# Patient Record
Sex: FEMALE | Race: BLACK OR AFRICAN AMERICAN | NOT HISPANIC OR LATINO | Employment: FULL TIME | RURAL
[De-identification: names, ages, dates, MRNs, and addresses within clinical notes are randomized per-mention and may not be internally consistent; named-entity substitution may affect disease eponyms.]

---
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
## 2021-01-11 ENCOUNTER — HISTORICAL (OUTPATIENT)
Dept: ADMINISTRATIVE | Facility: HOSPITAL | Age: 21
End: 2021-01-11

## 2021-01-11 LAB — SARS-COV+SARS-COV-2 AG RESP QL IA.RAPID: NEGATIVE

## 2021-01-26 ENCOUNTER — HISTORICAL (OUTPATIENT)
Dept: ADMINISTRATIVE | Facility: HOSPITAL | Age: 21
End: 2021-01-26

## 2021-01-26 LAB
ALBUMIN SERPL BCP-MCNC: 4.7 G/DL (ref 3.5–5)
ALBUMIN/GLOB SERPL: 1.1 {RATIO}
ALP SERPL-CCNC: 65 U/L (ref 52–144)
ALT SERPL W P-5'-P-CCNC: 21 U/L (ref 13–56)
AMPHET UR QL SCN: NEGATIVE NG/ML
ANION GAP SERPL CALCULATED.3IONS-SCNC: 15 MMOL/L
APAP SERPL-MCNC: <2 UG/ML (ref 10–30)
AST SERPL W P-5'-P-CCNC: 14 U/L (ref 15–37)
BARBITURATES UR QL SCN: NEGATIVE NG/ML
BASOPHILS # BLD AUTO: 0.03 X10E3/UL (ref 0–0.2)
BASOPHILS NFR BLD AUTO: 0.4 % (ref 0–1)
BENZODIAZ METAB UR QL SCN: NEGATIVE NG/ML
BILIRUB SERPL-MCNC: 0.9 MG/DL (ref 0–1.2)
BILIRUB UR QL STRIP: NEGATIVE MG/DL
BUN SERPL-MCNC: 10 MG/DL (ref 7–18)
BUN/CREAT SERPL: 10.5
CALCIUM SERPL-MCNC: 9.4 MG/DL (ref 8.5–10.1)
CANNABINOIDS UR QL SCN: NEGATIVE NG/ML
CHLORIDE SERPL-SCNC: 103 MMOL/L (ref 98–107)
CLARITY UR: ABNORMAL
CO2 SERPL-SCNC: 27 MMOL/L (ref 21–32)
COCAINE UR QL SCN: NEGATIVE NG/ML
COLOR UR: ABNORMAL
CREAT SERPL-MCNC: 0.95 MG/DL (ref 0.55–1.02)
EOSINOPHIL # BLD AUTO: 0.02 X10E3/UL (ref 0–0.5)
EOSINOPHIL NFR BLD AUTO: 0.3 % (ref 1–4)
ERYTHROCYTE [DISTWIDTH] IN BLOOD BY AUTOMATED COUNT: 12.1 % (ref 11.5–14.5)
ETHANOL SERPL-MCNC: NORMAL MG/DL (ref 0–10)
GLOBULIN SER-MCNC: 4.4 G/DL (ref 2–4)
GLUCOSE SERPL-MCNC: 85 MG/DL (ref 74–106)
GLUCOSE UR STRIP-MCNC: NEGATIVE MG/DL
HCG UR QL IA.RAPID: NEGATIVE
HCT VFR BLD AUTO: 42.9 % (ref 38–47)
HGB BLD-MCNC: 14 G/DL (ref 12–16)
IMM GRANULOCYTES # BLD AUTO: 0.01 X10E3/UL (ref 0–0.04)
IMM GRANULOCYTES NFR BLD: 0.1 % (ref 0–0.4)
KETONES UR STRIP-SCNC: NEGATIVE MG/DL
LEUKOCYTE ESTERASE UR QL STRIP: NEGATIVE LEU/UL
LYMPHOCYTES # BLD AUTO: 3.2 X10E3/UL (ref 1–4.8)
LYMPHOCYTES NFR BLD AUTO: 47.3 % (ref 27–41)
MCH RBC QN AUTO: 30.2 PG (ref 27–31)
MCHC RBC AUTO-ENTMCNC: 32.6 G/DL (ref 32–36)
MCV RBC AUTO: 92.5 FL (ref 80–96)
MONOCYTES # BLD AUTO: 0.51 X10E3/UL (ref 0–0.8)
MONOCYTES NFR BLD AUTO: 7.5 % (ref 2–6)
MPC BLD CALC-MCNC: 11.7 FL (ref 9.4–12.4)
NEUTROPHILS # BLD AUTO: 2.99 X10E3/UL (ref 1.8–7.7)
NEUTROPHILS NFR BLD AUTO: 44.4 % (ref 53–65)
NITRITE UR QL STRIP: NEGATIVE
NRBC # BLD AUTO: 0 X10E3/UL (ref 0–0)
NRBC, AUTO (.00): 0 /100 (ref 0–0)
OPIATES UR QL SCN: NEGATIVE NG/ML
PCP UR QL SCN: NEGATIVE NG/ML
PH UR STRIP: 7 PH UNITS (ref 5–8)
PLATELET # BLD AUTO: 250 X10E3/UL (ref 150–400)
POTASSIUM SERPL-SCNC: 3.4 MMOL/L (ref 3.5–5.1)
PROT SERPL-MCNC: 9.1 G/DL (ref 6.4–8.2)
PROT UR QL STRIP: ABNORMAL MG/DL
RBC # BLD AUTO: 4.64 X10E6/UL (ref 4.2–5.4)
RBC # UR STRIP: ABNORMAL ERY/UL
SALICYLATES SERPL-MCNC: <0.2 MG/DL (ref 3–30)
SODIUM SERPL-SCNC: 142 MMOL/L (ref 136–145)
SP GR UR STRIP: 1.01 (ref 1–1.03)
UROBILINOGEN UR STRIP-ACNC: 0.2 EU/DL
WBC # BLD AUTO: 6.76 X10E3/UL (ref 4.5–11)

## 2021-08-09 ENCOUNTER — OFFICE VISIT (OUTPATIENT)
Dept: FAMILY MEDICINE | Facility: CLINIC | Age: 21
End: 2021-08-09

## 2021-08-09 VITALS
OXYGEN SATURATION: 99 % | HEIGHT: 61 IN | DIASTOLIC BLOOD PRESSURE: 82 MMHG | HEART RATE: 72 BPM | RESPIRATION RATE: 16 BRPM | TEMPERATURE: 98 F | SYSTOLIC BLOOD PRESSURE: 118 MMHG | WEIGHT: 146.38 LBS | BODY MASS INDEX: 27.64 KG/M2

## 2021-08-09 DIAGNOSIS — N89.8 VAGINAL DISCHARGE: Primary | ICD-10-CM

## 2021-08-09 PROCEDURE — 99213 OFFICE O/P EST LOW 20 MIN: CPT | Mod: ,,, | Performed by: NURSE PRACTITIONER

## 2021-08-09 PROCEDURE — 99213 PR OFFICE/OUTPT VISIT, EST, LEVL III, 20-29 MIN: ICD-10-PCS | Mod: ,,, | Performed by: NURSE PRACTITIONER

## 2021-08-09 RX ORDER — METRONIDAZOLE 500 MG/1
500 TABLET ORAL 2 TIMES DAILY
Qty: 14 TABLET | Refills: 0 | Status: SHIPPED | OUTPATIENT
Start: 2021-08-09 | End: 2021-08-16

## 2021-08-10 LAB
CANDIDA SPECIES: NEGATIVE
GARDNERELLA: POSITIVE
TRICHOMONAS: NEGATIVE

## 2021-08-10 PROCEDURE — 87660 TRICHOMONAS VAGIN DIR PROBE: CPT | Mod: ,,, | Performed by: CLINICAL MEDICAL LABORATORY

## 2021-08-10 PROCEDURE — 87480 CANDIDA DNA DIR PROBE: CPT | Mod: ,,, | Performed by: CLINICAL MEDICAL LABORATORY

## 2021-08-10 PROCEDURE — 87510 GARDNER VAG DNA DIR PROBE: CPT | Mod: ,,, | Performed by: CLINICAL MEDICAL LABORATORY

## 2021-08-10 PROCEDURE — 87480 BACTERIAL VAGINOSIS: ICD-10-PCS | Mod: ,,, | Performed by: CLINICAL MEDICAL LABORATORY

## 2021-08-10 PROCEDURE — 87660 BACTERIAL VAGINOSIS: ICD-10-PCS | Mod: ,,, | Performed by: CLINICAL MEDICAL LABORATORY

## 2021-08-10 PROCEDURE — 87510 BACTERIAL VAGINOSIS: ICD-10-PCS | Mod: ,,, | Performed by: CLINICAL MEDICAL LABORATORY

## 2021-08-10 PROCEDURE — 87591 N.GONORRHOEAE DNA AMP PROB: CPT | Mod: ,,, | Performed by: CLINICAL MEDICAL LABORATORY

## 2021-08-10 PROCEDURE — 87491 CHLAMYDIA/GONORRHOEAE(GC), PCR: ICD-10-PCS | Mod: ,,, | Performed by: CLINICAL MEDICAL LABORATORY

## 2021-08-10 PROCEDURE — 87491 CHLMYD TRACH DNA AMP PROBE: CPT | Mod: ,,, | Performed by: CLINICAL MEDICAL LABORATORY

## 2021-08-10 PROCEDURE — 87591 CHLAMYDIA/GONORRHOEAE(GC), PCR: ICD-10-PCS | Mod: ,,, | Performed by: CLINICAL MEDICAL LABORATORY

## 2021-08-11 ENCOUNTER — CLINICAL SUPPORT (OUTPATIENT)
Dept: FAMILY MEDICINE | Facility: CLINIC | Age: 21
End: 2021-08-11

## 2021-08-11 DIAGNOSIS — Z71.2 ENCOUNTER TO DISCUSS TEST RESULTS: Primary | ICD-10-CM

## 2021-08-11 LAB
CHLAMYDIA BY PCR: POSITIVE
N. GONORRHOEAE (GC) BY PCR: NEGATIVE

## 2021-08-11 RX ORDER — AZITHROMYCIN 500 MG/1
1000 TABLET, FILM COATED ORAL DAILY
Qty: 2 TABLET | Refills: 0 | Status: SHIPPED | OUTPATIENT
Start: 2021-08-11 | End: 2023-03-13

## 2021-08-14 ENCOUNTER — TELEPHONE (OUTPATIENT)
Dept: FAMILY MEDICINE | Facility: CLINIC | Age: 21
End: 2021-08-14

## 2023-02-20 ENCOUNTER — OFFICE VISIT (OUTPATIENT)
Dept: FAMILY MEDICINE | Facility: CLINIC | Age: 23
End: 2023-02-20
Payer: MEDICAID

## 2023-02-20 VITALS
TEMPERATURE: 99 F | SYSTOLIC BLOOD PRESSURE: 112 MMHG | HEIGHT: 59 IN | DIASTOLIC BLOOD PRESSURE: 76 MMHG | WEIGHT: 157 LBS | RESPIRATION RATE: 20 BRPM | OXYGEN SATURATION: 100 % | BODY MASS INDEX: 31.65 KG/M2 | HEART RATE: 82 BPM

## 2023-02-20 DIAGNOSIS — Z20.828 CONTACT WITH OR EXPOSURE TO VIRAL DISEASE: Primary | ICD-10-CM

## 2023-02-20 LAB
CTP QC/QA: YES
SARS-COV-2 AG RESP QL IA.RAPID: NEGATIVE

## 2023-02-20 PROCEDURE — 3078F DIAST BP <80 MM HG: CPT | Mod: CPTII,,, | Performed by: FAMILY MEDICINE

## 2023-02-20 PROCEDURE — 3078F PR MOST RECENT DIASTOLIC BLOOD PRESSURE < 80 MM HG: ICD-10-PCS | Mod: CPTII,,, | Performed by: FAMILY MEDICINE

## 2023-02-20 PROCEDURE — 3074F SYST BP LT 130 MM HG: CPT | Mod: CPTII,,, | Performed by: FAMILY MEDICINE

## 2023-02-20 PROCEDURE — 3074F PR MOST RECENT SYSTOLIC BLOOD PRESSURE < 130 MM HG: ICD-10-PCS | Mod: CPTII,,, | Performed by: FAMILY MEDICINE

## 2023-02-20 PROCEDURE — 87426 SARSCOV CORONAVIRUS AG IA: CPT | Mod: QW,RHCUB | Performed by: FAMILY MEDICINE

## 2023-02-20 PROCEDURE — 99213 PR OFFICE/OUTPT VISIT, EST, LEVL III, 20-29 MIN: ICD-10-PCS | Mod: ,,, | Performed by: FAMILY MEDICINE

## 2023-02-20 PROCEDURE — 87426 PR SARS-COV-2 COVID-19 IMMUNOASSAY QUAL/SEMIQUANT, MULT STEP METHOD: ICD-10-PCS | Mod: QW,,, | Performed by: FAMILY MEDICINE

## 2023-02-20 PROCEDURE — 99213 OFFICE O/P EST LOW 20 MIN: CPT | Mod: ,,, | Performed by: FAMILY MEDICINE

## 2023-02-20 PROCEDURE — 87426 SARSCOV CORONAVIRUS AG IA: CPT | Mod: QW,,, | Performed by: FAMILY MEDICINE

## 2023-02-20 PROCEDURE — 3008F PR BODY MASS INDEX (BMI) DOCUMENTED: ICD-10-PCS | Mod: CPTII,,, | Performed by: FAMILY MEDICINE

## 2023-02-20 PROCEDURE — 3008F BODY MASS INDEX DOCD: CPT | Mod: CPTII,,, | Performed by: FAMILY MEDICINE

## 2023-02-20 NOTE — LETTER
February 20, 2023      [facility]  [address] [facility] [address]  Phone: [phone]  Fax: [phone]       Patient: Nikki Dacosta   YOB: 2000  Date of Visit: 02/20/2023    To Whom It May Concern:    Juan Dacosta  was at [facility] on 02/20/2023. The patient may return to work/school on 02/25/2023 with no restrictions. If you have any questions or concerns, or if I can be of further assistance, please do not hesitate to contact me.    Sincerely,    Dr. Kevin Sánchez

## 2023-02-21 NOTE — PROGRESS NOTES
Subjective:       Patient ID: Nikki Dacosta is a 22 y.o. female.    Chief Complaint: COVID-19 Concerns (Covid exposure)    COVID exposure but no symptoms no cough congestion fever or fatigue    Review of Systems      Objective:      Physical Exam  Constitutional:       General: She is not in acute distress.     Appearance: She is not ill-appearing.   HENT:      Nose: No congestion.      Mouth/Throat:      Pharynx: No posterior oropharyngeal erythema.   Cardiovascular:      Rate and Rhythm: Normal rate and regular rhythm.   Pulmonary:      Effort: Pulmonary effort is normal.      Breath sounds: Normal breath sounds.   Neurological:      Mental Status: She is alert.       Assessment:       Problem List Items Addressed This Visit    None  Visit Diagnoses       Contact with or exposure to viral disease    -  Primary    Relevant Orders    SARS Coronavirus 2 Antigen, POCT (Completed)              Plan:       COVID is negative.

## 2023-03-13 ENCOUNTER — OFFICE VISIT (OUTPATIENT)
Dept: FAMILY MEDICINE | Facility: CLINIC | Age: 23
End: 2023-03-13
Payer: COMMERCIAL

## 2023-03-13 ENCOUNTER — OFFICE VISIT (OUTPATIENT)
Dept: SURGERY | Facility: CLINIC | Age: 23
End: 2023-03-13
Attending: SURGERY
Payer: COMMERCIAL

## 2023-03-13 VITALS
RESPIRATION RATE: 19 BRPM | SYSTOLIC BLOOD PRESSURE: 118 MMHG | HEIGHT: 60 IN | OXYGEN SATURATION: 100 % | BODY MASS INDEX: 30.75 KG/M2 | TEMPERATURE: 98 F | DIASTOLIC BLOOD PRESSURE: 87 MMHG | WEIGHT: 156.63 LBS | HEART RATE: 95 BPM

## 2023-03-13 DIAGNOSIS — A59.01 TRICHOMONAS VAGINITIS: ICD-10-CM

## 2023-03-13 DIAGNOSIS — N76.0 BV (BACTERIAL VAGINOSIS): ICD-10-CM

## 2023-03-13 DIAGNOSIS — B96.89 BV (BACTERIAL VAGINOSIS): ICD-10-CM

## 2023-03-13 DIAGNOSIS — Z11.3 SCREENING EXAMINATION FOR VENEREAL DISEASE: ICD-10-CM

## 2023-03-13 DIAGNOSIS — L02.215 PERINEAL ABSCESS: Primary | ICD-10-CM

## 2023-03-13 LAB
CANDIDA SPECIES: NEGATIVE
GARDNERELLA: POSITIVE
TRICHOMONAS: POSITIVE

## 2023-03-13 PROCEDURE — 87076 CULTURE, ANAEROBE: ICD-10-PCS | Mod: ,,, | Performed by: CLINICAL MEDICAL LABORATORY

## 2023-03-13 PROCEDURE — 1159F MED LIST DOCD IN RCRD: CPT | Mod: CPTII,,, | Performed by: NURSE PRACTITIONER

## 2023-03-13 PROCEDURE — 10061 PR DRAIN SKIN ABSCESS COMPLIC: ICD-10-PCS | Mod: S$PBB,,, | Performed by: SURGERY

## 2023-03-13 PROCEDURE — 87070 CULTURE OTHR SPECIMN AEROBIC: CPT | Mod: ,,, | Performed by: CLINICAL MEDICAL LABORATORY

## 2023-03-13 PROCEDURE — 1159F MED LIST DOCD IN RCRD: CPT | Mod: CPTII,,, | Performed by: SURGERY

## 2023-03-13 PROCEDURE — 87510 BACTERIAL VAGINOSIS: ICD-10-PCS | Mod: ,,, | Performed by: CLINICAL MEDICAL LABORATORY

## 2023-03-13 PROCEDURE — 3008F PR BODY MASS INDEX (BMI) DOCUMENTED: ICD-10-PCS | Mod: CPTII,,, | Performed by: NURSE PRACTITIONER

## 2023-03-13 PROCEDURE — 1159F PR MEDICATION LIST DOCUMENTED IN MEDICAL RECORD: ICD-10-PCS | Mod: CPTII,,, | Performed by: NURSE PRACTITIONER

## 2023-03-13 PROCEDURE — 10061 I&D ABSCESS COMP/MULTIPLE: CPT | Mod: PBBFAC | Performed by: SURGERY

## 2023-03-13 PROCEDURE — 87480 CANDIDA DNA DIR PROBE: CPT | Mod: ,,, | Performed by: CLINICAL MEDICAL LABORATORY

## 2023-03-13 PROCEDURE — 99213 OFFICE O/P EST LOW 20 MIN: CPT | Mod: 25,S$PBB,, | Performed by: SURGERY

## 2023-03-13 PROCEDURE — 3079F DIAST BP 80-89 MM HG: CPT | Mod: CPTII,,, | Performed by: NURSE PRACTITIONER

## 2023-03-13 PROCEDURE — 87591 CHLAMYDIA/GONORRHOEAE(GC), PCR: ICD-10-PCS | Mod: ,,, | Performed by: CLINICAL MEDICAL LABORATORY

## 2023-03-13 PROCEDURE — 99214 PR OFFICE/OUTPT VISIT, EST, LEVL IV, 30-39 MIN: ICD-10-PCS | Mod: ,,, | Performed by: NURSE PRACTITIONER

## 2023-03-13 PROCEDURE — 87070 CULTURE, WOUND: ICD-10-PCS | Mod: ,,, | Performed by: CLINICAL MEDICAL LABORATORY

## 2023-03-13 PROCEDURE — 87076 CULTURE ANAEROBE IDENT EACH: CPT | Mod: ,,, | Performed by: CLINICAL MEDICAL LABORATORY

## 2023-03-13 PROCEDURE — 10061 I&D ABSCESS COMP/MULTIPLE: CPT | Mod: S$PBB,,, | Performed by: SURGERY

## 2023-03-13 PROCEDURE — 87591 N.GONORRHOEAE DNA AMP PROB: CPT | Mod: ,,, | Performed by: CLINICAL MEDICAL LABORATORY

## 2023-03-13 PROCEDURE — 87075 CULTURE, ANAEROBE: ICD-10-PCS | Mod: ,,, | Performed by: CLINICAL MEDICAL LABORATORY

## 2023-03-13 PROCEDURE — 99213 PR OFFICE/OUTPT VISIT, EST, LEVL III, 20-29 MIN: ICD-10-PCS | Mod: 25,S$PBB,, | Performed by: SURGERY

## 2023-03-13 PROCEDURE — 87075 CULTR BACTERIA EXCEPT BLOOD: CPT | Mod: ,,, | Performed by: CLINICAL MEDICAL LABORATORY

## 2023-03-13 PROCEDURE — 87660 BACTERIAL VAGINOSIS: ICD-10-PCS | Mod: ,,, | Performed by: CLINICAL MEDICAL LABORATORY

## 2023-03-13 PROCEDURE — 81025 URINE PREGNANCY TEST: CPT | Mod: QW,,, | Performed by: NURSE PRACTITIONER

## 2023-03-13 PROCEDURE — 87491 CHLAMYDIA/GONORRHOEAE(GC), PCR: ICD-10-PCS | Mod: ,,, | Performed by: CLINICAL MEDICAL LABORATORY

## 2023-03-13 PROCEDURE — 99214 OFFICE O/P EST MOD 30 MIN: CPT | Mod: ,,, | Performed by: NURSE PRACTITIONER

## 2023-03-13 PROCEDURE — 81003 URINALYSIS AUTO W/O SCOPE: CPT | Mod: QW,RHCUB | Performed by: NURSE PRACTITIONER

## 2023-03-13 PROCEDURE — 87480 BACTERIAL VAGINOSIS: ICD-10-PCS | Mod: ,,, | Performed by: CLINICAL MEDICAL LABORATORY

## 2023-03-13 PROCEDURE — 3008F BODY MASS INDEX DOCD: CPT | Mod: CPTII,,, | Performed by: NURSE PRACTITIONER

## 2023-03-13 PROCEDURE — 3079F PR MOST RECENT DIASTOLIC BLOOD PRESSURE 80-89 MM HG: ICD-10-PCS | Mod: CPTII,,, | Performed by: NURSE PRACTITIONER

## 2023-03-13 PROCEDURE — 81003 PR URINALYSIS, AUTO, W/O SCOPE: ICD-10-PCS | Mod: QW,,, | Performed by: NURSE PRACTITIONER

## 2023-03-13 PROCEDURE — 87510 GARDNER VAG DNA DIR PROBE: CPT | Mod: ,,, | Performed by: CLINICAL MEDICAL LABORATORY

## 2023-03-13 PROCEDURE — 87491 CHLMYD TRACH DNA AMP PROBE: CPT | Mod: ,,, | Performed by: CLINICAL MEDICAL LABORATORY

## 2023-03-13 PROCEDURE — 81003 URINALYSIS AUTO W/O SCOPE: CPT | Mod: QW,,, | Performed by: NURSE PRACTITIONER

## 2023-03-13 PROCEDURE — 3074F PR MOST RECENT SYSTOLIC BLOOD PRESSURE < 130 MM HG: ICD-10-PCS | Mod: CPTII,,, | Performed by: NURSE PRACTITIONER

## 2023-03-13 PROCEDURE — 81025 URINE PREGNANCY TEST: CPT | Mod: QW,RHCUB | Performed by: NURSE PRACTITIONER

## 2023-03-13 PROCEDURE — 81025 PR  URINE PREGNANCY TEST: ICD-10-PCS | Mod: QW,,, | Performed by: NURSE PRACTITIONER

## 2023-03-13 PROCEDURE — 1159F PR MEDICATION LIST DOCUMENTED IN MEDICAL RECORD: ICD-10-PCS | Mod: CPTII,,, | Performed by: SURGERY

## 2023-03-13 PROCEDURE — 3074F SYST BP LT 130 MM HG: CPT | Mod: CPTII,,, | Performed by: NURSE PRACTITIONER

## 2023-03-13 PROCEDURE — 87660 TRICHOMONAS VAGIN DIR PROBE: CPT | Mod: ,,, | Performed by: CLINICAL MEDICAL LABORATORY

## 2023-03-13 PROCEDURE — 99213 OFFICE O/P EST LOW 20 MIN: CPT | Mod: PBBFAC | Performed by: SURGERY

## 2023-03-13 RX ORDER — METRONIDAZOLE 500 MG/1
500 TABLET ORAL EVERY 12 HOURS
Qty: 14 TABLET | Refills: 0 | Status: SHIPPED | OUTPATIENT
Start: 2023-03-13 | End: 2023-03-20

## 2023-03-13 RX ORDER — SULFAMETHOXAZOLE AND TRIMETHOPRIM 800; 160 MG/1; MG/1
1 TABLET ORAL 2 TIMES DAILY
Qty: 14 TABLET | Refills: 0 | Status: SHIPPED | OUTPATIENT
Start: 2023-03-13 | End: 2023-03-20

## 2023-03-13 NOTE — PROGRESS NOTES
Please notify pt she has BV and trich-  I sent in meds to treat- I tried to call her but did not get an answer- she left a number [phone]

## 2023-03-13 NOTE — PROGRESS NOTES
General Surgery History and Physical      Patient ID: Nikki Dacosta is a 22 y.o. female.    Chief Complaint: Abscess      HPI:  22-year-old female who comes in with a 2-3 day history of an abscess on her left inner thigh as well as her right axilla.  The 1 on her right axilla she is had for little longer and she is been squeezing it out intermittently but it is still is there coming back.  Pain to both these areas no fever no chills she is had the 1 on her left thigh before this is recurrence.  The 1st time it happened went away on its own.  She denies any fever no chills nondiabetic.  She has not been on antibiotics.    Review of Systems   Constitutional:  Negative for activity change, appetite change, fatigue and fever.   HENT:  Negative for trouble swallowing.    Respiratory:  Negative for cough and shortness of breath.    Cardiovascular:  Negative for chest pain and palpitations.   Gastrointestinal:  Negative for abdominal distention, abdominal pain, blood in stool, constipation and diarrhea.   Genitourinary:  Negative for flank pain.   Musculoskeletal:  Negative for neck pain and neck stiffness.   Skin:  Positive for wound.   Neurological:  Negative for weakness.     Current Outpatient Medications   Medication Sig Dispense Refill    metroNIDAZOLE (FLAGYL) 500 MG tablet Take 1 tablet (500 mg total) by mouth every 12 (twelve) hours. for 14 doses 14 tablet 0    sulfamethoxazole-trimethoprim 800-160mg (BACTRIM DS) 800-160 mg Tab Take 1 tablet by mouth 2 (two) times daily. for 7 days 14 tablet 0     No current facility-administered medications for this visit.       Review of patient's allergies indicates:  No Known Allergies    History reviewed. No pertinent past medical history.    History reviewed. No pertinent surgical history.    History reviewed. No pertinent family history.    Social History     Socioeconomic History     Marital status: Single   Tobacco Use    Smoking status: Never    Smokeless tobacco: Never       There were no vitals filed for this visit.    Physical Exam  Constitutional:       General: She is not in acute distress.  HENT:      Head: Normocephalic.   Cardiovascular:      Rate and Rhythm: Normal rate and regular rhythm.      Pulses: Normal pulses.   Pulmonary:      Effort: Pulmonary effort is normal. No respiratory distress.      Breath sounds: Normal breath sounds.   Abdominal:      General: Abdomen is flat. There is no distension.      Palpations: Abdomen is soft.      Tenderness: There is no abdominal tenderness.   Musculoskeletal:         General: Normal range of motion.   Skin:     General: Skin is warm.      Findings: Lesion (Left inner thigh has an approximately 3 x 2 cm area of induration and fluctuance consistent with an abscess.  Right axilla also has a 3 x 2 cm fluctuant tender area consistent with an abscess) present.   Neurological:      General: No focal deficit present.      Mental Status: She is oriented to person, place, and time.       Assessment & Plan:    Perineal abscess  -     Culture, Anaerobe  -     Culture, Wound    Trichomonas vaginitis  -     metroNIDAZOLE (FLAGYL) 500 MG tablet; Take 1 tablet (500 mg total) by mouth every 12 (twelve) hours. for 14 doses  Dispense: 14 tablet; Refill: 0    BV (bacterial vaginosis)  -     metroNIDAZOLE (FLAGYL) 500 MG tablet; Take 1 tablet (500 mg total) by mouth every 12 (twelve) hours. for 14 doses  Dispense: 14 tablet; Refill: 0        Will drain both this right axillary and left inner thigh abscess.  Risks and benefits explained to the patient.  All questions were answered.  Will call and some Bactrim for the patient as well.

## 2023-03-13 NOTE — LETTER
March 13, 2023      [facility]  [address]  [facility] [address]  Phone: [phone]  Fax: [phone]       Patient: Nikki Dacosta   YOB: 2000  Date of Visit: 03/13/2023    To Whom It May Concern:    Juan Dacosta  was at [facility] on 03/13/2023. The patient may return to work/school on 3/15/2023 with no restrictions. If you have any questions or concerns, or if I can be of further assistance, please do not hesitate to contact me.    Sincerely,    Rocio Stevens RN

## 2023-03-13 NOTE — PROGRESS NOTES
Subjective:       Patient ID: Nikki Dacosta is a 22 y.o. female.    Chief Complaint: risen (On vaginal area.)    Perineal abscess  STD testing  Vaginal discharge  Review of Systems   Constitutional:  Negative for chills, fatigue and fever.   Genitourinary:  Positive for vaginal discharge. Negative for dysuria, flank pain, frequency, genital sores and urgency.   Integumentary:  Positive for color change and mole/lesion.       Objective:      Physical Exam  Vitals and nursing note reviewed.   Constitutional:       General: She is not in acute distress.     Appearance: Normal appearance. She is not ill-appearing, toxic-appearing or diaphoretic.   HENT:      Head: Normocephalic.   Cardiovascular:      Rate and Rhythm: Normal rate and regular rhythm.      Pulses: Normal pulses.      Heart sounds: Normal heart sounds.   Pulmonary:      Effort: Pulmonary effort is normal.      Breath sounds: Normal breath sounds.   Genitourinary:     Vagina: Vaginal discharge present.          Comments: abscess  Skin:     General: Skin is warm and dry.      Findings: Abscess and erythema present.      Comments: perineal   Neurological:      Mental Status: She is alert and oriented to person, place, and time.   Psychiatric:         Mood and Affect: Mood normal.         Behavior: Behavior normal.         Thought Content: Thought content normal.         Judgment: Judgment normal.       Office Visit on 02/20/2023   Component Date Value Ref Range Status    SARS Coronavirus 2 Antigen 02/20/2023 Negative  Negative Final     Acceptable 02/20/2023 Yes   Final      Assessment:       1. Perineal abscess    2. Screening examination for venereal disease          Plan:   Perineal abscess  -     Ambulatory referral/consult to General Surgery; Future; Expected date: 03/20/2023  -     sulfamethoxazole-trimethoprim 800-160mg (BACTRIM DS) 800-160 mg Tab; Take 1 tablet by mouth 2 (two) times daily. for 7 days  Dispense: 14 tablet; Refill:  0    Screening examination for venereal disease  -     Chlamydia/GC, PCR; Future; Expected date: 03/13/2023  -     POCT urine pregnancy  -     Bacterial Vaginosis; Future; Expected date: 03/13/2023  -     POCT URINALYSIS W/O SCOPE  -     HIV 1/2 Ag/Ab (4th Gen); Future; Expected date: 03/13/2023  -     Herpes simplex type 1&2 IgG,Herpes titer; Future; Expected date: 03/13/2023  -     Hepatitis panel, acute; Future; Expected date: 03/13/2023  -     Herpes simplex type 1 & 2 IgM,Herpes IgM; Future; Expected date: 03/13/2023  -     Syphilis Antibody with reflex to RPR; Future; Expected date: 03/13/2023         Risks, benefits, and side effects were discussed with the patient. All questions were answered to the fullest satisfaction of the patient, and pt verbalized understanding and agreement to treatment plan. Pt was to call with any new or worsening symptoms, or present to the ER

## 2023-03-13 NOTE — PROCEDURES
Procedures    Preoperative diagnosis: Abscess of left thigh and right axilla  Postoperative diagnosis: As above  Procedure Performed: Incision and drainage of left thigh and right axilla    Surgeon: Dr. Umang Dawn  Anesthesia: Local 1% lidocaine with epinephrine  Blood loss: Minimal  Complications: None  Specimen:     Procedure in detail:  After informed consent patient's left thigh and right axilla was prepped and draped in the usual sterile fashion.  Injected local in and around the area.  We made an incision over the abscess and got into the pocket of purulence.  We then irrigated it out and packed it with quarter-inch iodoform gauze The area was dressed and patient tolerated the procedure well.

## 2023-03-14 LAB
CHLAMYDIA BY PCR: NEGATIVE
N. GONORRHOEAE (GC) BY PCR: NEGATIVE

## 2023-03-15 LAB — MICROORGANISM SPEC CULT: NO GROWTH

## 2023-03-18 LAB — BACTERIA SPEC ANAEROBE CULT: ABNORMAL

## 2023-05-01 ENCOUNTER — HOSPITAL ENCOUNTER (EMERGENCY)
Facility: HOSPITAL | Age: 23
Discharge: HOME | End: 2023-05-01
Attending: EMERGENCY MEDICINE
Payer: MEDICAID

## 2023-05-01 VITALS
HEART RATE: 66 BPM | OXYGEN SATURATION: 100 % | WEIGHT: 160 LBS | BODY MASS INDEX: 32.25 KG/M2 | TEMPERATURE: 98 F | HEIGHT: 59 IN | SYSTOLIC BLOOD PRESSURE: 104 MMHG | RESPIRATION RATE: 15 BRPM | DIASTOLIC BLOOD PRESSURE: 71 MMHG

## 2023-05-01 DIAGNOSIS — T14.90XA TRAUMA: ICD-10-CM

## 2023-05-01 PROCEDURE — 99284 EMERGENCY DEPT VISIT MOD MDM: CPT | Mod: ,,, | Performed by: EMERGENCY MEDICINE

## 2023-05-01 PROCEDURE — 96372 THER/PROPH/DIAG INJ SC/IM: CPT | Performed by: EMERGENCY MEDICINE

## 2023-05-01 PROCEDURE — 99284 PR EMERGENCY DEPT VISIT,LEVEL IV: ICD-10-PCS | Mod: ,,, | Performed by: EMERGENCY MEDICINE

## 2023-05-01 PROCEDURE — 63600175 PHARM REV CODE 636 W HCPCS: Performed by: EMERGENCY MEDICINE

## 2023-05-01 PROCEDURE — 99284 EMERGENCY DEPT VISIT MOD MDM: CPT | Mod: 25

## 2023-05-01 RX ORDER — KETOROLAC TROMETHAMINE 30 MG/ML
60 INJECTION, SOLUTION INTRAMUSCULAR; INTRAVENOUS
Status: COMPLETED | OUTPATIENT
Start: 2023-05-01 | End: 2023-05-01

## 2023-05-01 RX ORDER — METHOCARBAMOL 500 MG/1
TABLET, FILM COATED ORAL 3 TIMES DAILY
Qty: 30 TABLET | Refills: 0 | Status: SHIPPED | OUTPATIENT
Start: 2023-05-01 | End: 2023-05-06

## 2023-05-01 RX ADMIN — KETOROLAC TROMETHAMINE 60 MG: 30 INJECTION, SOLUTION INTRAMUSCULAR at 11:05

## 2023-05-01 NOTE — DISCHARGE INSTRUCTIONS
Your x-rays look okay    Use ibuprofen 400-600 mg 3 times a day for the next week as needed.  Also use Tylenol 4 times a day as needed.    Decreased activity.  Return the ER if symptoms are worsening or new symptoms develop.  Follow up with primary care as needed

## 2023-05-01 NOTE — Clinical Note
"Nikki Peterson"Olesya was seen and treated in our emergency department on 5/1/2023.  She may return to work on 05/04/2023.       If you have any questions or concerns, please don't hesitate to call.      Reuben Zavala MD"
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
## 2023-05-11 ENCOUNTER — OFFICE VISIT (OUTPATIENT)
Dept: FAMILY MEDICINE | Facility: CLINIC | Age: 23
End: 2023-05-11
Payer: MEDICAID

## 2023-05-11 VITALS
OXYGEN SATURATION: 99 % | RESPIRATION RATE: 17 BRPM | DIASTOLIC BLOOD PRESSURE: 78 MMHG | HEIGHT: 59 IN | HEART RATE: 61 BPM | TEMPERATURE: 98 F | WEIGHT: 148 LBS | BODY MASS INDEX: 29.84 KG/M2 | SYSTOLIC BLOOD PRESSURE: 98 MMHG

## 2023-05-11 DIAGNOSIS — N93.9 VAGINAL SPOTTING: ICD-10-CM

## 2023-05-11 DIAGNOSIS — A59.01 TRICHOMONAS VAGINITIS: ICD-10-CM

## 2023-05-11 DIAGNOSIS — R10.30 LOWER ABDOMINAL PAIN: Primary | ICD-10-CM

## 2023-05-11 LAB
B-HCG UR QL: NEGATIVE
B-HCG UR QL: NEGATIVE
BILIRUB SERPL-MCNC: NEGATIVE MG/DL
BILIRUB SERPL-MCNC: NEGATIVE MG/DL
BLOOD URINE, POC: NEGATIVE
BLOOD URINE, POC: NEGATIVE
COLOR, POC UA: YELLOW
COLOR, POC UA: YELLOW
CTP QC/QA: YES
CTP QC/QA: YES
GLUCOSE UR QL STRIP: NEGATIVE
GLUCOSE UR QL STRIP: NEGATIVE
KETONES UR QL STRIP: ABNORMAL
KETONES UR QL STRIP: ABNORMAL
LEUKOCYTE ESTERASE URINE, POC: NEGATIVE
LEUKOCYTE ESTERASE URINE, POC: NEGATIVE
NITRITE, POC UA: NEGATIVE
NITRITE, POC UA: NEGATIVE
PH, POC UA: 5.5
PH, POC UA: 5.5
PROTEIN, POC: NEGATIVE
PROTEIN, POC: NEGATIVE
SPECIFIC GRAVITY, POC UA: >=1.03
SPECIFIC GRAVITY, POC UA: >=1.03
UROBILINOGEN, POC UA: 0.2
UROBILINOGEN, POC UA: 0.2

## 2023-05-11 PROCEDURE — 99213 PR OFFICE/OUTPT VISIT, EST, LEVL III, 20-29 MIN: ICD-10-PCS | Mod: ,,, | Performed by: NURSE PRACTITIONER

## 2023-05-11 PROCEDURE — 81003 URINALYSIS AUTO W/O SCOPE: CPT | Mod: QW,RHCUB | Performed by: NURSE PRACTITIONER

## 2023-05-11 PROCEDURE — 81025 URINE PREGNANCY TEST: CPT | Mod: RHCUB | Performed by: NURSE PRACTITIONER

## 2023-05-11 PROCEDURE — 99213 OFFICE O/P EST LOW 20 MIN: CPT | Mod: ,,, | Performed by: NURSE PRACTITIONER

## 2023-05-11 RX ORDER — METRONIDAZOLE 500 MG/1
500 TABLET ORAL EVERY 12 HOURS
Qty: 14 TABLET | Refills: 0 | Status: SHIPPED | OUTPATIENT
Start: 2023-05-11 | End: 2023-05-18

## 2023-05-11 NOTE — PROGRESS NOTES
Subjective:       Patient ID: Nikki Dacosta is a 22 y.o. female.    Chief Complaint: Abdominal Pain (Patient present to clinic with lower abdominal pain and vaginal spotting for a few days. )    Abdominal Pain cramping (Patient present to clinic with lower abdominal pain and vaginal spotting for a few days. )- pt has the Nexplanon- pt tested positive for trichomonas last month but did not  her medications      Abdominal Pain  Pertinent negatives include no dysuria, fever, frequency, headaches, nausea or vomiting.   Review of Systems   Constitutional:  Negative for appetite change, chills, fatigue and fever.   HENT:  Negative for nasal congestion, ear pain and sore throat.    Eyes:  Negative for pain, discharge and itching.   Respiratory:  Negative for cough and shortness of breath.    Cardiovascular:  Negative for chest pain and leg swelling.   Gastrointestinal:  Positive for abdominal pain. Negative for change in bowel habit, nausea, vomiting and change in bowel habit.   Genitourinary:  Positive for menstrual problem and vaginal bleeding. Negative for dysuria, flank pain, frequency, genital sores and urgency.   Musculoskeletal:  Negative for back pain, gait problem and neck pain.   Integumentary:  Negative for rash and wound.   Allergic/Immunologic: Negative for immunocompromised state.   Neurological:  Negative for dizziness, weakness and headaches.   All other systems reviewed and are negative.      Objective:      Physical Exam  Constitutional:       General: She is not in acute distress.     Appearance: Normal appearance. She is not ill-appearing, toxic-appearing or diaphoretic.   Eyes:      Pupils: Pupils are equal, round, and reactive to light.   Cardiovascular:      Rate and Rhythm: Normal rate and regular rhythm.      Pulses: Normal pulses.      Heart sounds: Normal heart sounds.   Pulmonary:      Effort: Pulmonary effort is normal.      Breath sounds: Normal breath sounds. No wheezing or rhonchi.    Abdominal:      General: Bowel sounds are normal.      Palpations: Abdomen is soft.      Tenderness: There is no abdominal tenderness. There is no right CVA tenderness, left CVA tenderness, guarding or rebound.   Skin:     General: Skin is warm and dry.   Neurological:      Mental Status: She is alert and oriented to person, place, and time.   Psychiatric:         Mood and Affect: Mood normal.         Behavior: Behavior normal.          Assessment:       1. Lower abdominal pain    2. Vaginal spotting        Plan:   Lower abdominal pain  -     POCT URINALYSIS W/O SCOPE  -     POCT urine pregnancy    Vaginal spotting         Risks, benefits, and side effects were discussed with the patient. All questions were answered to the fullest satisfaction of the patient, and pt verbalized understanding and agreement to treatment plan. Pt was to call with any new or worsening symptoms, or present to the ER

## 2023-05-11 NOTE — LETTER
May 11, 2023      [facility]  [address] [facility] [address]  Phone: [phone]  Fax: [phone]       Patient: Nikki Dacosta   YOB: 2000  Date of Visit: 05/11/2023    To Whom It May Concern:    Juan Dacosta  was at [facility] on 05/11/2023. The patient may return to work/school on 05/12/2023 with no restrictions. If you have any questions or concerns, or if I can be of further assistance, please do not hesitate to contact me.    Sincerely,    Paulo Nolen LPN

## 2023-07-02 ENCOUNTER — OFFICE VISIT (OUTPATIENT)
Dept: FAMILY MEDICINE | Facility: CLINIC | Age: 23
End: 2023-07-02
Payer: MEDICAID

## 2023-07-02 VITALS
WEIGHT: 140.38 LBS | HEIGHT: 59 IN | SYSTOLIC BLOOD PRESSURE: 116 MMHG | RESPIRATION RATE: 18 BRPM | HEART RATE: 95 BPM | BODY MASS INDEX: 28.3 KG/M2 | OXYGEN SATURATION: 97 % | DIASTOLIC BLOOD PRESSURE: 72 MMHG | TEMPERATURE: 99 F

## 2023-07-02 DIAGNOSIS — R82.998 GREEN-COLORED URINE: ICD-10-CM

## 2023-07-02 DIAGNOSIS — Z20.2 EXPOSURE TO SEXUALLY TRANSMITTED DISEASE (STD): Primary | ICD-10-CM

## 2023-07-02 DIAGNOSIS — R82.2 BILIRUBINURIA: ICD-10-CM

## 2023-07-02 LAB
ALBUMIN SERPL BCP-MCNC: 4.2 G/DL (ref 3.5–5)
ALP SERPL-CCNC: 53 U/L (ref 52–144)
ALT SERPL W P-5'-P-CCNC: 20 U/L (ref 13–56)
AST SERPL W P-5'-P-CCNC: 12 U/L (ref 15–37)
BILIRUB DIRECT SERPL-MCNC: 0.2 MG/DL (ref 0–0.2)
BILIRUB SERPL-MCNC: 0.7 MG/DL (ref ?–1.2)
BILIRUB SERPL-MCNC: ABNORMAL MG/DL
BLOOD URINE, POC: ABNORMAL
CANDIDA SPECIES: POSITIVE
COLOR, POC UA: ABNORMAL
GARDNERELLA: POSITIVE
GLUCOSE UR QL STRIP: NEGATIVE
HIV 1+O+2 AB SERPL QL: NORMAL
KETONES UR QL STRIP: ABNORMAL
LEUKOCYTE ESTERASE URINE, POC: NEGATIVE
NITRITE, POC UA: NEGATIVE
PH, POC UA: 5.5
PROT SERPL-MCNC: 7.9 G/DL (ref 6.4–8.2)
PROTEIN, POC: ABNORMAL
SPECIFIC GRAVITY, POC UA: >=1.03
SYPHILIS AB INTERPRETATION: NORMAL
TRICHOMONAS: NEGATIVE
UROBILINOGEN, POC UA: 0.2

## 2023-07-02 PROCEDURE — 87660 TRICHOMONAS VAGIN DIR PROBE: CPT | Mod: ,,, | Performed by: CLINICAL MEDICAL LABORATORY

## 2023-07-02 PROCEDURE — 87591 N.GONORRHOEAE DNA AMP PROB: CPT | Mod: ,,, | Performed by: CLINICAL MEDICAL LABORATORY

## 2023-07-02 PROCEDURE — 87389 HIV-1 AG W/HIV-1&-2 AB AG IA: CPT | Mod: ,,, | Performed by: CLINICAL MEDICAL LABORATORY

## 2023-07-02 PROCEDURE — 87660 BACTERIAL VAGINOSIS: ICD-10-PCS | Mod: ,,, | Performed by: CLINICAL MEDICAL LABORATORY

## 2023-07-02 PROCEDURE — 87491 CHLAMYDIA/GONORRHOEAE(GC), PCR: ICD-10-PCS | Mod: ,,, | Performed by: CLINICAL MEDICAL LABORATORY

## 2023-07-02 PROCEDURE — 87480 CANDIDA DNA DIR PROBE: CPT | Mod: ,,, | Performed by: CLINICAL MEDICAL LABORATORY

## 2023-07-02 PROCEDURE — 1160F RVW MEDS BY RX/DR IN RCRD: CPT | Mod: CPTII,,, | Performed by: NURSE PRACTITIONER

## 2023-07-02 PROCEDURE — 87491 CHLMYD TRACH DNA AMP PROBE: CPT | Mod: ,,, | Performed by: CLINICAL MEDICAL LABORATORY

## 2023-07-02 PROCEDURE — 3074F SYST BP LT 130 MM HG: CPT | Mod: CPTII,,, | Performed by: NURSE PRACTITIONER

## 2023-07-02 PROCEDURE — 86696 HERPES SIMPLEX 1 & 2 IGG: ICD-10-PCS | Mod: ,,, | Performed by: CLINICAL MEDICAL LABORATORY

## 2023-07-02 PROCEDURE — 86696 HERPES SIMPLEX TYPE 2 TEST: CPT | Mod: ,,, | Performed by: CLINICAL MEDICAL LABORATORY

## 2023-07-02 PROCEDURE — 80076 HEPATIC FUNCTION PANEL: ICD-10-PCS | Mod: ,,, | Performed by: CLINICAL MEDICAL LABORATORY

## 2023-07-02 PROCEDURE — 3078F DIAST BP <80 MM HG: CPT | Mod: CPTII,,, | Performed by: NURSE PRACTITIONER

## 2023-07-02 PROCEDURE — 87389 HIV 1 / 2 ANTIBODY: ICD-10-PCS | Mod: ,,, | Performed by: CLINICAL MEDICAL LABORATORY

## 2023-07-02 PROCEDURE — 3074F PR MOST RECENT SYSTOLIC BLOOD PRESSURE < 130 MM HG: ICD-10-PCS | Mod: CPTII,,, | Performed by: NURSE PRACTITIONER

## 2023-07-02 PROCEDURE — 81003 URINALYSIS AUTO W/O SCOPE: CPT | Mod: QW,RHCUB | Performed by: NURSE PRACTITIONER

## 2023-07-02 PROCEDURE — 87510 BACTERIAL VAGINOSIS: ICD-10-PCS | Mod: ,,, | Performed by: CLINICAL MEDICAL LABORATORY

## 2023-07-02 PROCEDURE — 80076 HEPATIC FUNCTION PANEL: CPT | Mod: ,,, | Performed by: CLINICAL MEDICAL LABORATORY

## 2023-07-02 PROCEDURE — 86780 TREPONEMA PALLIDUM: CPT | Mod: ,,, | Performed by: CLINICAL MEDICAL LABORATORY

## 2023-07-02 PROCEDURE — 87510 GARDNER VAG DNA DIR PROBE: CPT | Mod: ,,, | Performed by: CLINICAL MEDICAL LABORATORY

## 2023-07-02 PROCEDURE — 99051 MED SERV EVE/WKEND/HOLIDAY: CPT | Mod: ,,, | Performed by: NURSE PRACTITIONER

## 2023-07-02 PROCEDURE — 86695 HERPES SIMPLEX 1 & 2 IGG: ICD-10-PCS | Mod: ,,, | Performed by: CLINICAL MEDICAL LABORATORY

## 2023-07-02 PROCEDURE — 87591 CHLAMYDIA/GONORRHOEAE(GC), PCR: ICD-10-PCS | Mod: ,,, | Performed by: CLINICAL MEDICAL LABORATORY

## 2023-07-02 PROCEDURE — 3008F PR BODY MASS INDEX (BMI) DOCUMENTED: ICD-10-PCS | Mod: CPTII,,, | Performed by: NURSE PRACTITIONER

## 2023-07-02 PROCEDURE — 1160F PR REVIEW ALL MEDS BY PRESCRIBER/CLIN PHARMACIST DOCUMENTED: ICD-10-PCS | Mod: CPTII,,, | Performed by: NURSE PRACTITIONER

## 2023-07-02 PROCEDURE — 99213 PR OFFICE/OUTPT VISIT, EST, LEVL III, 20-29 MIN: ICD-10-PCS | Mod: ,,, | Performed by: NURSE PRACTITIONER

## 2023-07-02 PROCEDURE — 87480 BACTERIAL VAGINOSIS: ICD-10-PCS | Mod: ,,, | Performed by: CLINICAL MEDICAL LABORATORY

## 2023-07-02 PROCEDURE — 1159F MED LIST DOCD IN RCRD: CPT | Mod: CPTII,,, | Performed by: NURSE PRACTITIONER

## 2023-07-02 PROCEDURE — 86780 TREPONEMA PALLIDUM (SYPHILIS) ANTIBODY: ICD-10-PCS | Mod: ,,, | Performed by: CLINICAL MEDICAL LABORATORY

## 2023-07-02 PROCEDURE — 86695 HERPES SIMPLEX TYPE 1 TEST: CPT | Mod: ,,, | Performed by: CLINICAL MEDICAL LABORATORY

## 2023-07-02 PROCEDURE — 3008F BODY MASS INDEX DOCD: CPT | Mod: CPTII,,, | Performed by: NURSE PRACTITIONER

## 2023-07-02 PROCEDURE — 3078F PR MOST RECENT DIASTOLIC BLOOD PRESSURE < 80 MM HG: ICD-10-PCS | Mod: CPTII,,, | Performed by: NURSE PRACTITIONER

## 2023-07-02 PROCEDURE — 99213 OFFICE O/P EST LOW 20 MIN: CPT | Mod: ,,, | Performed by: NURSE PRACTITIONER

## 2023-07-02 PROCEDURE — 99051 PR MEDICAL SERVICES, EVE/WKEND/HOLIDAY: ICD-10-PCS | Mod: ,,, | Performed by: NURSE PRACTITIONER

## 2023-07-02 PROCEDURE — 1159F PR MEDICATION LIST DOCUMENTED IN MEDICAL RECORD: ICD-10-PCS | Mod: CPTII,,, | Performed by: NURSE PRACTITIONER

## 2023-07-02 NOTE — PROGRESS NOTES
"Subjective:       Patient ID: Nikki Dacosta is a 22 y.o. female.    Chief Complaint: Exposure to STD (Possible. Want to be tested for everything)    Presents to clinic as above. No s/s. No skin rash, genital rash, genital lesions, abd pain or vaginal discharge. No dysuria. LMP . Has a Nexplanon implant for birth control. States urine has a green tint to it's color.     Review of Systems   Constitutional: Negative.    Respiratory: Negative.     Cardiovascular: Negative.    Gastrointestinal:  Negative for abdominal pain.   Genitourinary: Negative.    Skin: Negative.         Reviewed family, medical, surgical, and social history.    Objective:      /72 (BP Location: Right arm, Patient Position: Sitting, BP Method: Large (Automatic))   Pulse 95   Temp 98.6 °F (37 °C) (Oral)   Resp 18   Ht 4' 11" (1.499 m)   Wt 63.7 kg (140 lb 6.4 oz)   SpO2 97%   BMI 28.36 kg/m²   Physical Exam  Vitals and nursing note reviewed.   Constitutional:       General: She is not in acute distress.     Appearance: Normal appearance. She is normal weight. She is not ill-appearing, toxic-appearing or diaphoretic.   Cardiovascular:      Rate and Rhythm: Normal rate and regular rhythm.   Pulmonary:      Effort: Pulmonary effort is normal.      Breath sounds: Normal breath sounds.   Abdominal:      General: Abdomen is flat. There is no distension.      Palpations: Abdomen is soft. There is no mass.      Tenderness: There is no abdominal tenderness. There is no right CVA tenderness, left CVA tenderness, guarding or rebound.      Hernia: No hernia is present.   Skin:     General: Skin is warm and dry.      Capillary Refill: Capillary refill takes less than 2 seconds.   Neurological:      Mental Status: She is alert and oriented to person, place, and time.   Psychiatric:         Mood and Affect: Mood normal.         Behavior: Behavior normal.         Thought Content: Thought content normal.         Judgment: Judgment normal.      "     Office Visit on 07/02/2023   Component Date Value Ref Range Status    Color, UA 07/02/2023 Dark Yellow   Final    Spec Grav UA 07/02/2023 >=1.030   Final    pH, UA 07/02/2023 5.5   Final    WBC, UA 07/02/2023 Negative   Final    Nitrite, UA 07/02/2023 Negative   Final    Protein, POC 07/02/2023 30 mg   Final    Glucose, UA 07/02/2023 Negative   Final    Ketones, UA 07/02/2023 Trace   Final    Bilirubin, POC 07/02/2023 Small   Final    Urobilinogen, UA 07/02/2023 0.2   Final    Blood, UA 07/02/2023 Trace   Final      Assessment:       1. Exposure to sexually transmitted disease (STD)    2. Green-colored urine    3. Bilirubinuria        Plan:       Exposure to sexually transmitted disease (STD)  -     HSV 1 & 2, IgG; Future; Expected date: 07/02/2023  -     HIV 1/2 Ag/Ab (4th Gen); Future; Expected date: 07/02/2023  -     Chlamydia/GC, PCR  -     Syphilis Antibody with reflex to RPR; Future; Expected date: 07/02/2023  -     Bacterial Vaginosis  -     POCT URINALYSIS W/O SCOPE    Green-colored urine  -     Bilirubin, Total and Direct; Future; Expected date: 07/02/2023  -     Hepatic function panel; Future; Expected date: 07/02/2023  -     Cancel: Bilirubin, Total and Direct; Future; Expected date: 07/02/2023    Bilirubinuria  -     Bilirubin, Total and Direct; Future; Expected date: 07/02/2023  -     Hepatic function panel; Future; Expected date: 07/02/2023  -     Cancel: Bilirubin, Total and Direct; Future; Expected date: 07/02/2023    I will call when results back  RTC PRN          Risks, benefits, and side effects were discussed with the patient. All questions were answered to the fullest satisfaction of the patient, and pt verbalized understanding and agreement to treatment plan. Pt was to call with any new or worsening symptoms, or present to the ER.

## 2023-07-03 DIAGNOSIS — B96.89 BV (BACTERIAL VAGINOSIS): Primary | ICD-10-CM

## 2023-07-03 DIAGNOSIS — N76.0 BV (BACTERIAL VAGINOSIS): Primary | ICD-10-CM

## 2023-07-03 DIAGNOSIS — B37.31 VAGINA, CANDIDIASIS: ICD-10-CM

## 2023-07-03 DIAGNOSIS — A54.9 GONORRHEA: ICD-10-CM

## 2023-07-03 LAB
CHLAMYDIA BY PCR: NEGATIVE
N. GONORRHOEAE (GC) BY PCR: POSITIVE

## 2023-07-03 RX ORDER — CEFTRIAXONE 500 MG/1
500 INJECTION, POWDER, FOR SOLUTION INTRAMUSCULAR; INTRAVENOUS
Status: DISCONTINUED | OUTPATIENT
Start: 2023-07-03 | End: 2023-10-25 | Stop reason: CLARIF

## 2023-07-03 RX ORDER — FLUCONAZOLE 150 MG/1
150 TABLET ORAL DAILY
Qty: 1 TABLET | Refills: 0 | Status: SHIPPED | OUTPATIENT
Start: 2023-07-03 | End: 2023-07-04

## 2023-07-03 RX ORDER — METRONIDAZOLE 500 MG/1
500 TABLET ORAL 2 TIMES DAILY
Qty: 14 TABLET | Refills: 0 | Status: SHIPPED | OUTPATIENT
Start: 2023-07-03 | End: 2023-07-10

## 2023-07-05 LAB
HSV TYPE 1 AB IGG INDEX: 4.56
HSV TYPE 2 AB IGG INDEX: 0
HSV1 IGG SER QL: POSITIVE
HSV2 IGG SER QL: NEGATIVE

## 2023-07-06 ENCOUNTER — TELEPHONE (OUTPATIENT)
Dept: FAMILY MEDICINE | Facility: CLINIC | Age: 23
End: 2023-07-06
Payer: COMMERCIAL

## 2023-07-06 NOTE — TELEPHONE ENCOUNTER
Not able to contact pt for results and treatment. Demographics will be faxed to [facility].----- Message from FLORENTINO Fraire sent at 7/3/2023 10:46 AM CDT -----  Notify of positive gonorrhea, candida and BV. She needs Rocephin. I sent in flagyl and diflucan.

## 2023-09-16 ENCOUNTER — OFFICE VISIT (OUTPATIENT)
Dept: FAMILY MEDICINE | Facility: CLINIC | Age: 23
End: 2023-09-16
Payer: COMMERCIAL

## 2023-09-16 VITALS
DIASTOLIC BLOOD PRESSURE: 65 MMHG | RESPIRATION RATE: 18 BRPM | HEART RATE: 63 BPM | HEIGHT: 59 IN | WEIGHT: 138.19 LBS | OXYGEN SATURATION: 100 % | BODY MASS INDEX: 27.86 KG/M2 | SYSTOLIC BLOOD PRESSURE: 100 MMHG

## 2023-09-16 DIAGNOSIS — Z72.51 HIGH RISK HETEROSEXUAL BEHAVIOR: Primary | ICD-10-CM

## 2023-09-16 LAB
B-HCG UR QL: NEGATIVE
BILIRUB SERPL-MCNC: NORMAL MG/DL
BLOOD URINE, POC: NEGATIVE
CANDIDA SPECIES: NEGATIVE
COLOR, POC UA: YELLOW
CTP QC/QA: YES
GARDNERELLA: POSITIVE
GLUCOSE UR QL STRIP: NEGATIVE
HIV 1+O+2 AB SERPL QL: NORMAL
KETONES UR QL STRIP: NEGATIVE
LEUKOCYTE ESTERASE URINE, POC: NEGATIVE
NITRITE, POC UA: NEGATIVE
PH, POC UA: 5.5
PROTEIN, POC: NORMAL
SPECIFIC GRAVITY, POC UA: >=1.03
SYPHILIS AB INTERPRETATION: NORMAL
TRICHOMONAS: NEGATIVE
UROBILINOGEN, POC UA: 0.2

## 2023-09-16 PROCEDURE — 87491 CHLMYD TRACH DNA AMP PROBE: CPT | Mod: ,,, | Performed by: CLINICAL MEDICAL LABORATORY

## 2023-09-16 PROCEDURE — 99214 PR OFFICE/OUTPT VISIT, EST, LEVL IV, 30-39 MIN: ICD-10-PCS | Mod: ,,, | Performed by: FAMILY MEDICINE

## 2023-09-16 PROCEDURE — 99051 PR MEDICAL SERVICES, EVE/WKEND/HOLIDAY: ICD-10-PCS | Mod: ,,, | Performed by: FAMILY MEDICINE

## 2023-09-16 PROCEDURE — 86694 HERPES SIMPLEX NES ANTBDY: CPT | Mod: ,,, | Performed by: CLINICAL MEDICAL LABORATORY

## 2023-09-16 PROCEDURE — 87389 HIV-1 AG W/HIV-1&-2 AB AG IA: CPT | Mod: ,,, | Performed by: CLINICAL MEDICAL LABORATORY

## 2023-09-16 PROCEDURE — 87480 BACTERIAL VAGINOSIS: ICD-10-PCS | Mod: ,,, | Performed by: CLINICAL MEDICAL LABORATORY

## 2023-09-16 PROCEDURE — 86780 TREPONEMA PALLIDUM: CPT | Mod: ,,, | Performed by: CLINICAL MEDICAL LABORATORY

## 2023-09-16 PROCEDURE — 87661 TRICHOMONAS VAGINALIS BY PCR: ICD-10-PCS | Mod: ,,, | Performed by: CLINICAL MEDICAL LABORATORY

## 2023-09-16 PROCEDURE — 81003 POCT URINALYSIS W/O SCOPE: ICD-10-PCS | Mod: QW,,, | Performed by: FAMILY MEDICINE

## 2023-09-16 PROCEDURE — 87510 GARDNER VAG DNA DIR PROBE: CPT | Mod: ,,, | Performed by: CLINICAL MEDICAL LABORATORY

## 2023-09-16 PROCEDURE — 81025 URINE PREGNANCY TEST: CPT | Mod: QW,,, | Performed by: FAMILY MEDICINE

## 2023-09-16 PROCEDURE — 86695 HERPES SIMPLEX TYPE 1 TEST: CPT | Mod: ,,, | Performed by: CLINICAL MEDICAL LABORATORY

## 2023-09-16 PROCEDURE — 87661 TRICHOMONAS VAGINALIS AMPLIF: CPT | Mod: ,,, | Performed by: CLINICAL MEDICAL LABORATORY

## 2023-09-16 PROCEDURE — 87480 CANDIDA DNA DIR PROBE: CPT | Mod: ,,, | Performed by: CLINICAL MEDICAL LABORATORY

## 2023-09-16 PROCEDURE — 87510 BACTERIAL VAGINOSIS: ICD-10-PCS | Mod: ,,, | Performed by: CLINICAL MEDICAL LABORATORY

## 2023-09-16 PROCEDURE — 99051 MED SERV EVE/WKEND/HOLIDAY: CPT | Mod: ,,, | Performed by: FAMILY MEDICINE

## 2023-09-16 PROCEDURE — 86694 HERPES SIMPLEX 1 & 2 IGM: ICD-10-PCS | Mod: ,,, | Performed by: CLINICAL MEDICAL LABORATORY

## 2023-09-16 PROCEDURE — 99214 OFFICE O/P EST MOD 30 MIN: CPT | Mod: ,,, | Performed by: FAMILY MEDICINE

## 2023-09-16 PROCEDURE — 81025 POCT URINE PREGNANCY: ICD-10-PCS | Mod: QW,,, | Performed by: FAMILY MEDICINE

## 2023-09-16 PROCEDURE — 87389 HIV 1 / 2 ANTIBODY: ICD-10-PCS | Mod: ,,, | Performed by: CLINICAL MEDICAL LABORATORY

## 2023-09-16 PROCEDURE — 87591 CHLAMYDIA/GONORRHOEAE(GC), PCR: ICD-10-PCS | Mod: ,,, | Performed by: CLINICAL MEDICAL LABORATORY

## 2023-09-16 PROCEDURE — 87660 TRICHOMONAS VAGIN DIR PROBE: CPT | Mod: 59,,, | Performed by: CLINICAL MEDICAL LABORATORY

## 2023-09-16 PROCEDURE — 86696 HERPES SIMPLEX 1 & 2 IGG: ICD-10-PCS | Mod: ,,, | Performed by: CLINICAL MEDICAL LABORATORY

## 2023-09-16 PROCEDURE — 81003 URINALYSIS AUTO W/O SCOPE: CPT | Mod: QW,,, | Performed by: FAMILY MEDICINE

## 2023-09-16 PROCEDURE — 87491 CHLAMYDIA/GONORRHOEAE(GC), PCR: ICD-10-PCS | Mod: ,,, | Performed by: CLINICAL MEDICAL LABORATORY

## 2023-09-16 PROCEDURE — 86696 HERPES SIMPLEX TYPE 2 TEST: CPT | Mod: ,,, | Performed by: CLINICAL MEDICAL LABORATORY

## 2023-09-16 PROCEDURE — 87591 N.GONORRHOEAE DNA AMP PROB: CPT | Mod: ,,, | Performed by: CLINICAL MEDICAL LABORATORY

## 2023-09-16 PROCEDURE — 87660 BACTERIAL VAGINOSIS: ICD-10-PCS | Mod: 59,,, | Performed by: CLINICAL MEDICAL LABORATORY

## 2023-09-16 PROCEDURE — 86780 TREPONEMA PALLIDUM (SYPHILIS) ANTIBODY: ICD-10-PCS | Mod: ,,, | Performed by: CLINICAL MEDICAL LABORATORY

## 2023-09-16 PROCEDURE — 86695 HERPES SIMPLEX 1 & 2 IGG: ICD-10-PCS | Mod: ,,, | Performed by: CLINICAL MEDICAL LABORATORY

## 2023-09-16 RX ORDER — ONDANSETRON 4 MG/1
4 TABLET, FILM COATED ORAL EVERY 8 HOURS PRN
Qty: 10 TABLET | Refills: 0 | Status: SHIPPED | OUTPATIENT
Start: 2023-09-16 | End: 2023-10-25 | Stop reason: CLARIF

## 2023-09-16 NOTE — PROGRESS NOTES
Subjective:       Patient ID: Nikki Dacosta is a 22 y.o. female.    Chief Complaint: check up and Nausea (Wants a pregnancy test.)    HPI  Review of Systems   Constitutional:  Negative for activity change, appetite change, chills, diaphoresis, fatigue, fever and unexpected weight change.   HENT:  Negative for nasal congestion, dental problem, drooling, ear discharge, ear pain, facial swelling, hearing loss, mouth sores, nosebleeds, postnasal drip, rhinorrhea, sinus pressure/congestion, sneezing, sore throat, tinnitus, trouble swallowing, voice change and goiter.    Eyes:  Negative for photophobia, discharge, itching and visual disturbance.   Respiratory:  Negative for apnea, cough, choking, chest tightness, shortness of breath, wheezing and stridor.    Cardiovascular:  Negative for chest pain, palpitations, leg swelling and claudication.   Gastrointestinal:  Positive for nausea. Negative for abdominal distention, abdominal pain, anal bleeding, blood in stool, change in bowel habit, constipation, diarrhea, vomiting and change in bowel habit.   Endocrine: Negative for cold intolerance, heat intolerance, polydipsia, polyphagia and polyuria.   Genitourinary:  Negative for bladder incontinence, decreased urine volume, difficulty urinating, dysuria, enuresis, flank pain, frequency, hematuria, nocturia, pelvic pain and urgency.   Musculoskeletal:  Negative for arthralgias, back pain, gait problem, joint swelling, leg pain, myalgias, neck pain, neck stiffness and joint deformity.   Integumentary:  Negative for pallor, rash, wound, breast mass and breast tenderness.   Allergic/Immunologic: Negative for environmental allergies, food allergies and immunocompromised state.   Neurological:  Negative for dizziness, vertigo, tremors, seizures, syncope, facial asymmetry, speech difficulty, weakness, light-headedness, numbness, headaches, coordination difficulties, memory loss and coordination difficulties.   Hematological:   Negative for adenopathy. Does not bruise/bleed easily.   Psychiatric/Behavioral:  Negative for agitation, behavioral problems, confusion, decreased concentration, dysphoric mood, hallucinations, self-injury, sleep disturbance and suicidal ideas. The patient is not nervous/anxious and is not hyperactive.    Breast: Negative for mass and tenderness        Objective:      Physical Exam  Vitals reviewed.   Constitutional:       Appearance: Normal appearance.   HENT:      Head: Normocephalic and atraumatic.      Right Ear: Tympanic membrane, ear canal and external ear normal.      Left Ear: Tympanic membrane, ear canal and external ear normal.      Nose: Nose normal.      Mouth/Throat:      Mouth: Mucous membranes are moist.      Pharynx: Oropharynx is clear.   Eyes:      Extraocular Movements: Extraocular movements intact.      Conjunctiva/sclera: Conjunctivae normal.      Pupils: Pupils are equal, round, and reactive to light.   Cardiovascular:      Rate and Rhythm: Normal rate and regular rhythm.      Pulses: Normal pulses.      Heart sounds: Normal heart sounds.   Pulmonary:      Effort: Pulmonary effort is normal.      Breath sounds: Normal breath sounds.   Abdominal:      General: Bowel sounds are normal.      Palpations: Abdomen is soft.   Musculoskeletal:         General: Normal range of motion.      Cervical back: Normal range of motion and neck supple.   Skin:     General: Skin is warm and dry.   Neurological:      General: No focal deficit present.      Mental Status: She is alert. Mental status is at baseline.   Psychiatric:         Mood and Affect: Mood normal.         Behavior: Behavior normal.         Thought Content: Thought content normal.         Judgment: Judgment normal.         Assessment:       1. High risk heterosexual behavior        Plan:     High risk heterosexual behavior  -     POCT URINALYSIS W/O SCOPE  -     POCT urine pregnancy  -     Chlamydia/GC, PCR; Future; Expected date: 09/16/2023  -      HSV 1 & 2, IgM; Future; Expected date: 09/16/2023  -     HSV 1 & 2, IgG; Future; Expected date: 09/16/2023  -     HIV 1/2 Ag/Ab (4th Gen); Future; Expected date: 09/16/2023  -     Syphilis Antibody with reflex to RPR; Future; Expected date: 09/16/2023  -     Trichomonas vaginalis by PCR; Future; Expected date: 09/16/2023  -     Bacterial Vaginosis; Future; Expected date: 09/16/2023    Other orders  -     ondansetron (ZOFRAN) 4 MG tablet; Take 1 tablet (4 mg total) by mouth every 8 (eight) hours as needed for Nausea.  Dispense: 10 tablet; Refill: 0

## 2023-09-16 NOTE — LETTER
September 16, 2023      [facility]  [address] [facility] [address]  Phone: [phone]  Fax: [phone]       Patient: Nikki Dacosta   YOB: 2000  Date of Visit: 09/16/2023    To Whom It May Concern:    Juan Dacosta  was at [facility] on 09/16/2023. The patient may return to work/school on 09/16/2023 with no restrictions. If you have any questions or concerns, or if I can be of further assistance, please do not hesitate to contact me.    Sincerely,    Maryanne Schuler LPN

## 2023-09-17 LAB
CHLAMYDIA BY PCR: NEGATIVE
N. GONORRHOEAE (GC) BY PCR: NEGATIVE
TRICHOMONAS NAT: NEGATIVE

## 2023-09-18 ENCOUNTER — TELEPHONE (OUTPATIENT)
Dept: FAMILY MEDICINE | Facility: CLINIC | Age: 23
End: 2023-09-18

## 2023-09-18 RX ORDER — METRONIDAZOLE 500 MG/1
500 TABLET ORAL EVERY 12 HOURS
Qty: 14 TABLET | Refills: 0 | Status: SHIPPED | OUTPATIENT
Start: 2023-09-18 | End: 2023-09-25

## 2023-09-18 NOTE — TELEPHONE ENCOUNTER
Attempted to call pt. No answer. ----- Message from Maurizio Sterling II, DO sent at 9/18/2023 10:29 AM CDT -----  BV present, will call in flagyl, notify patient, rest of std panel negative, notify patient

## 2023-09-19 LAB
HSV IGM SER QL IA: NEGATIVE
HSV TYPE 1 AB IGG INDEX: 2.67
HSV TYPE 2 AB IGG INDEX: 0.05
HSV1 IGG SER QL: POSITIVE
HSV2 IGG SER QL: NEGATIVE

## 2023-10-25 ENCOUNTER — HOSPITAL ENCOUNTER (EMERGENCY)
Facility: HOSPITAL | Age: 23
Discharge: HOME | End: 2023-10-25
Payer: COMMERCIAL

## 2023-10-25 VITALS
TEMPERATURE: 98 F | DIASTOLIC BLOOD PRESSURE: 73 MMHG | SYSTOLIC BLOOD PRESSURE: 103 MMHG | BODY MASS INDEX: 29.03 KG/M2 | OXYGEN SATURATION: 99 % | HEIGHT: 59 IN | WEIGHT: 144 LBS | HEART RATE: 88 BPM | RESPIRATION RATE: 14 BRPM

## 2023-10-25 DIAGNOSIS — J06.9 VIRAL URI WITH COUGH: Primary | ICD-10-CM

## 2023-10-25 PROCEDURE — 99283 PR EMERGENCY DEPT VISIT,LEVEL III: ICD-10-PCS | Mod: ,,, | Performed by: NURSE PRACTITIONER

## 2023-10-25 PROCEDURE — 99282 EMERGENCY DEPT VISIT SF MDM: CPT

## 2023-10-25 PROCEDURE — 99283 EMERGENCY DEPT VISIT LOW MDM: CPT | Mod: ,,, | Performed by: NURSE PRACTITIONER

## 2023-10-25 RX ORDER — CETIRIZINE HYDROCHLORIDE 10 MG/1
10 TABLET ORAL DAILY
Qty: 30 TABLET | Refills: 0 | Status: SHIPPED | OUTPATIENT
Start: 2023-10-25 | End: 2023-10-26

## 2023-10-25 RX ORDER — FLUTICASONE PROPIONATE 50 MCG
1 SPRAY, SUSPENSION (ML) NASAL 2 TIMES DAILY PRN
Qty: 15 G | Refills: 0 | Status: SHIPPED | OUTPATIENT
Start: 2023-10-25

## 2023-10-25 NOTE — ED PROVIDER NOTES
Encounter Date: 10/25/2023       History     Chief Complaint   Patient presents with    Nasal Congestion     23-year-old female presents to the emergency department to be evaluated for runny nose, sore throat and cough that began 1 week ago.    The history is provided by the patient.   URI  The primary symptoms include sore throat and cough. Primary symptoms do not include fever, fatigue, headaches, ear pain, swollen glands, wheezing, abdominal pain, nausea, vomiting, myalgias, arthralgias or rash.   Symptoms associated with the illness include congestion and rhinorrhea.     Review of patient's allergies indicates:  No Known Allergies  History reviewed. No pertinent past medical history.  History reviewed. No pertinent surgical history.  History reviewed. No pertinent family history.  Social History     Tobacco Use    Smoking status: Never     Passive exposure: Current    Smokeless tobacco: Never   Substance Use Topics    Alcohol use: Not Currently    Drug use: Not Currently     Review of Systems   Constitutional:  Negative for fatigue and fever.   HENT:  Positive for congestion, rhinorrhea and sore throat. Negative for ear pain.    Respiratory:  Positive for cough. Negative for wheezing.    Gastrointestinal:  Negative for abdominal pain, nausea and vomiting.   Musculoskeletal:  Negative for arthralgias and myalgias.   Skin:  Negative for rash.   Neurological:  Negative for headaches.   All other systems reviewed and are negative.      Physical Exam     Initial Vitals [10/25/23 0903]   BP Pulse Resp Temp SpO2   103/73 88 14 98.2 °F (36.8 °C) 99 %      MAP       --         Physical Exam    Vitals reviewed.  Constitutional: She appears well-developed and well-nourished.   HENT:   Right Ear: Tympanic membrane normal.   Left Ear: Tympanic membrane normal.   Mouth/Throat: Oropharynx is clear and moist.   Cardiovascular:  Normal rate and regular rhythm.           Pulmonary/Chest: Breath sounds normal.   Abdominal: Abdomen  is soft. Bowel sounds are normal. She exhibits no distension and no mass. There is no abdominal tenderness. There is no rebound and no guarding.   Musculoskeletal:         General: Normal range of motion.     Neurological: She is alert and oriented to person, place, and time. She has normal strength. GCS score is 15. GCS eye subscore is 4. GCS verbal subscore is 5. GCS motor subscore is 6.   Skin: Skin is warm and dry. Capillary refill takes less than 2 seconds.   Psychiatric: She has a normal mood and affect.         Medical Screening Exam   See Full Note    ED Course   Procedures  Labs Reviewed - No data to display       Imaging Results    None          Medications - No data to display  Medical Decision Making  23-year-old female presents to the emergency department to be evaluated for runny nose, sore throat and cough that began 1 week ago.  Diagnosis: Viral URI with cough  Prescribed Flonase and Zyrtec    Risk  OTC drugs.                               Clinical Impression:   Final diagnoses:  [J06.9] Viral URI with cough (Primary)        ED Disposition Condition    Discharge Stable          ED Prescriptions       Medication Sig Dispense Start Date End Date Auth. Provider    fluticasone propionate (FLONASE) 50 mcg/actuation nasal spray 1 spray (50 mcg total) by Each Nostril route 2 (two) times daily as needed. 15 g 10/25/2023 -- Vera Dalton FNP    cetirizine (ZYRTEC) 10 MG tablet Take 1 tablet (10 mg total) by mouth once daily. 30 tablet 10/25/2023 10/24/2024 Vera Dalton FNP          Follow-up Information    None          Vera Dalton FNP  10/25/23 5840

## 2023-10-26 RX ORDER — CETIRIZINE HYDROCHLORIDE 10 MG/1
10 TABLET ORAL
Qty: 90 TABLET | Refills: 0 | Status: SHIPPED | OUTPATIENT
Start: 2023-10-26

## 2023-11-17 ENCOUNTER — HOSPITAL ENCOUNTER (EMERGENCY)
Facility: HOSPITAL | Age: 23
Discharge: HOME | End: 2023-11-17

## 2023-11-17 VITALS
TEMPERATURE: 98 F | SYSTOLIC BLOOD PRESSURE: 138 MMHG | RESPIRATION RATE: 18 BRPM | BODY MASS INDEX: 28.63 KG/M2 | HEIGHT: 59 IN | HEART RATE: 105 BPM | WEIGHT: 142 LBS | OXYGEN SATURATION: 100 % | DIASTOLIC BLOOD PRESSURE: 76 MMHG

## 2023-11-17 DIAGNOSIS — J32.9 SINUSITIS, UNSPECIFIED CHRONICITY, UNSPECIFIED LOCATION: Primary | ICD-10-CM

## 2023-11-17 PROCEDURE — 99284 PR EMERGENCY DEPT VISIT,LEVEL IV: ICD-10-PCS | Mod: ,,, | Performed by: NURSE PRACTITIONER

## 2023-11-17 PROCEDURE — 99284 EMERGENCY DEPT VISIT MOD MDM: CPT | Mod: ,,, | Performed by: NURSE PRACTITIONER

## 2023-11-17 PROCEDURE — 99284 EMERGENCY DEPT VISIT MOD MDM: CPT

## 2023-11-17 RX ORDER — METHYLPREDNISOLONE 4 MG/1
TABLET ORAL
Qty: 21 EACH | Refills: 0 | Status: SHIPPED | OUTPATIENT
Start: 2023-11-17 | End: 2023-12-08

## 2023-11-17 RX ORDER — AMOXICILLIN AND CLAVULANATE POTASSIUM 875; 125 MG/1; MG/1
1 TABLET, FILM COATED ORAL EVERY 12 HOURS
Qty: 20 TABLET | Refills: 0 | Status: SHIPPED | OUTPATIENT
Start: 2023-11-17 | End: 2023-11-27

## 2023-11-17 NOTE — Clinical Note
"Nikki Peterson" Olesya was seen and treated in our emergency department on 11/17/2023.  She may return to work on 11/18/2023.       If you have any questions or concerns, please don't hesitate to call.      Leanne Grace, LYNNP"

## 2023-11-17 NOTE — ED PROVIDER NOTES
Encounter Date: 11/17/2023       History     Chief Complaint   Patient presents with    COVID-19 Concerns     Pt presents with sinus symptoms for over 1 week.       Review of patient's allergies indicates:  No Known Allergies  No past medical history on file.  No past surgical history on file.  No family history on file.  Social History     Tobacco Use    Smoking status: Never     Passive exposure: Current    Smokeless tobacco: Never   Substance Use Topics    Alcohol use: Not Currently    Drug use: Not Currently     Review of Systems   Constitutional:  Negative for fever.   HENT:  Positive for postnasal drip, rhinorrhea and sinus pressure. Negative for sore throat.    Respiratory:  Positive for cough. Negative for shortness of breath.    Cardiovascular:  Negative for chest pain.   Gastrointestinal:  Negative for nausea.   Genitourinary:  Negative for dysuria.   Musculoskeletal:  Negative for back pain.   Skin:  Negative for rash.   Neurological:  Negative for weakness.   Hematological:  Does not bruise/bleed easily.   Psychiatric/Behavioral:  Negative for behavioral problems.        Physical Exam     Initial Vitals [11/17/23 1232]   BP Pulse Resp Temp SpO2   138/76 105 18 98.4 °F (36.9 °C) 100 %      MAP       --         Physical Exam    Nursing note and vitals reviewed.  Constitutional: She appears well-developed.   HENT:   Head: Normocephalic.   Right Ear: External ear normal.   Left Ear: External ear normal.   Nose: Rhinorrhea present.   Eyes: Conjunctivae are normal.   Cardiovascular:  Normal rate and regular rhythm.           Pulmonary/Chest: Breath sounds normal.   Musculoskeletal:         General: Normal range of motion.     Neurological: She is alert and oriented to person, place, and time.   Psychiatric: She has a normal mood and affect. Thought content normal.         Medical Screening Exam   See Full Note    ED Course   Procedures  Labs Reviewed - No data to display       Imaging Results    None           Medications - No data to display  Medical Decision Making  Pt presents with sinus symptoms for over 1 week.       Risk  Prescription drug management.  Risk Details: Pt is discharged home in stable condition with diagnosis of sinusitis.                                    Clinical Impression:   Final diagnoses:  [J32.9] Sinusitis, unspecified chronicity, unspecified location (Primary)        ED Disposition Condition    Discharge Stable          ED Prescriptions       Medication Sig Dispense Start Date End Date Auth. Provider    methylPREDNISolone (MEDROL DOSEPACK) 4 mg tablet use as directed 21 each 11/17/2023 12/8/2023 Leanne Grace FNP    amoxicillin-clavulanate 875-125mg (AUGMENTIN) 875-125 mg per tablet Take 1 tablet by mouth every 12 (twelve) hours. for 10 days 20 tablet 11/17/2023 11/27/2023 Leanne Grace FNP          Follow-up Information    None          Leanne Grace FNP  11/17/23 1240

## 2023-12-08 ENCOUNTER — HOSPITAL ENCOUNTER (EMERGENCY)
Facility: HOSPITAL | Age: 23
Discharge: HOME | End: 2023-12-08
Payer: COMMERCIAL

## 2023-12-08 VITALS
OXYGEN SATURATION: 98 % | TEMPERATURE: 98 F | WEIGHT: 147 LBS | HEART RATE: 76 BPM | BODY MASS INDEX: 29.64 KG/M2 | RESPIRATION RATE: 18 BRPM | HEIGHT: 59 IN | SYSTOLIC BLOOD PRESSURE: 100 MMHG | DIASTOLIC BLOOD PRESSURE: 55 MMHG

## 2023-12-08 DIAGNOSIS — B34.9 VIRAL SYNDROME: Primary | ICD-10-CM

## 2023-12-08 LAB
FLUAV AG UPPER RESP QL IA.RAPID: NEGATIVE
FLUBV AG UPPER RESP QL IA.RAPID: NEGATIVE
SARS-COV-2 RDRP RESP QL NAA+PROBE: NEGATIVE

## 2023-12-08 PROCEDURE — 87635 SARS-COV-2 COVID-19 AMP PRB: CPT | Performed by: NURSE PRACTITIONER

## 2023-12-08 PROCEDURE — 99283 EMERGENCY DEPT VISIT LOW MDM: CPT | Mod: ,,, | Performed by: NURSE PRACTITIONER

## 2023-12-08 PROCEDURE — 99283 PR EMERGENCY DEPT VISIT,LEVEL III: ICD-10-PCS | Mod: ,,, | Performed by: NURSE PRACTITIONER

## 2023-12-08 PROCEDURE — 87804 INFLUENZA ASSAY W/OPTIC: CPT | Performed by: NURSE PRACTITIONER

## 2023-12-08 PROCEDURE — 99284 EMERGENCY DEPT VISIT MOD MDM: CPT

## 2023-12-08 RX ORDER — IBUPROFEN 800 MG/1
800 TABLET ORAL EVERY 6 HOURS PRN
Qty: 20 TABLET | Refills: 0 | Status: SHIPPED | OUTPATIENT
Start: 2023-12-08

## 2023-12-08 RX ORDER — PROMETHAZINE HYDROCHLORIDE 25 MG/1
25 TABLET ORAL EVERY 6 HOURS PRN
Qty: 15 TABLET | Refills: 0 | Status: SHIPPED | OUTPATIENT
Start: 2023-12-08

## 2023-12-08 NOTE — Clinical Note
"Nikki Peterson" Olesya was seen and treated in our emergency department on 12/8/2023.  She may return to work on 12/10/2023.       If you have any questions or concerns, please don't hesitate to call.      Anastasia Barclay, FNP"

## 2023-12-09 NOTE — ED PROVIDER NOTES
Encounter Date: 12/8/2023       History     Chief Complaint   Patient presents with    Weakness    Nausea     Patient presents to ED with c/o weakness and nausea that started 2 days ago.       23-year-old female presents to ED with complaint of weakness, nausea, headache.  Patient reports symptoms started 2-3 days ago with worsening symptoms tonight while at work.  Patient states headache is to front of her head; reports possible migraine due to onset of nausea with headache.  Denies cough, fever, chills, chest pain, shortness of breath, vomiting, diarrhea.  Reports generalized weakness/fatigue.  Patient reports sick contacts at work and is unsure if she has contracted illness from coworkers.    The history is provided by the patient. No  was used.     Review of patient's allergies indicates:  No Known Allergies  No past medical history on file.  No past surgical history on file.  No family history on file.  Social History     Tobacco Use    Smoking status: Never     Passive exposure: Current    Smokeless tobacco: Never   Substance Use Topics    Alcohol use: Not Currently    Drug use: Not Currently     Review of Systems   Constitutional:  Positive for fatigue. Negative for chills and fever.   HENT:  Negative for sinus pressure and sinus pain.    Respiratory:  Negative for cough and shortness of breath.    Cardiovascular:  Negative for chest pain and palpitations.   Gastrointestinal:  Positive for nausea. Negative for diarrhea and vomiting.   Genitourinary:  Negative for dysuria and urgency.   Musculoskeletal:  Negative for arthralgias and gait problem.   Skin:  Negative for color change and wound.   Neurological:  Positive for weakness and headaches.   Hematological:  Negative for adenopathy. Does not bruise/bleed easily.   Psychiatric/Behavioral:  Negative for agitation and confusion.    All other systems reviewed and are negative.      Physical Exam     Initial Vitals [12/08/23 1820]   BP Pulse  Resp Temp SpO2   124/67 72 16 98.3 °F (36.8 °C) 100 %      MAP       --         Physical Exam    Nursing note and vitals reviewed.  Constitutional: She appears well-developed and well-nourished. She appears ill.   HENT:   Head: Normocephalic and atraumatic.   Eyes: EOM are normal. Pupils are equal, round, and reactive to light.   Neck: Neck supple.   Normal range of motion.  Cardiovascular:  Normal rate and regular rhythm.           No murmur heard.  Pulmonary/Chest: She has no wheezes. She has no rhonchi.   Abdominal: Abdomen is soft. She exhibits no distension. There is no abdominal tenderness.   Musculoskeletal:         General: No tenderness or edema.      Cervical back: Normal range of motion and neck supple.     Lymphadenopathy:     She has no cervical adenopathy.   Neurological: She is alert and oriented to person, place, and time. No cranial nerve deficit or sensory deficit.   Skin: Skin is warm and dry. Capillary refill takes less than 2 seconds.   Psychiatric: She has a normal mood and affect.         Medical Screening Exam   See Full Note    ED Course   Procedures  Labs Reviewed   RAPID INFLUENZA A/B - Normal   SARS-COV-2 RNA AMPLIFICATION, QUAL - Normal    Narrative:     Negative SARS-CoV results should not be used as the sole basis for treatment or patient management decisions; negative results should be considered in the context of a patient's recent exposures, history and the presene of clinical signs and symptoms consistent with COVID-19.  Negative results should be treated as presumptive and confirmed by molecular assay, if necessary for patient management.          Imaging Results    None          Medications - No data to display  Medical Decision Making  23-year-old female presents to ED with complaint of weakness, nausea, headache.  Patient reports symptoms started 2-3 days ago with worsening symptoms tonight while at work.  Patient states headache is to front of her head; reports possible  migraine due to onset of nausea with headache.  Denies cough, fever, chills, chest pain, shortness of breath, vomiting, diarrhea.  Reports generalized weakness/fatigue.  Patient reports sick contacts at work and is unsure if she has contracted illness from coworkers.    Labs obtained. Prescription provided    Amount and/or Complexity of Data Reviewed  Labs: ordered.     Details: No acute viral processes                                      Clinical Impression:   Final diagnoses:  [B34.9] Viral syndrome (Primary)        ED Disposition Condition    Discharge Stable          ED Prescriptions       Medication Sig Dispense Start Date End Date Auth. Provider    ibuprofen (ADVIL,MOTRIN) 800 MG tablet Take 1 tablet (800 mg total) by mouth every 6 (six) hours as needed for Pain. 20 tablet 12/8/2023 -- Anastasia Barclay FNP    promethazine (PHENERGAN) 25 MG tablet Take 1 tablet (25 mg total) by mouth every 6 (six) hours as needed for Nausea. 15 tablet 12/8/2023 -- Anastasia Barclay FNP          Follow-up Information    None          Anastasia Barclay FNP  12/08/23 1927

## 2024-01-04 ENCOUNTER — OFFICE VISIT (OUTPATIENT)
Dept: OBSTETRICS AND GYNECOLOGY | Facility: CLINIC | Age: 24
End: 2024-01-04
Payer: COMMERCIAL

## 2024-01-04 VITALS
DIASTOLIC BLOOD PRESSURE: 70 MMHG | RESPIRATION RATE: 17 BRPM | OXYGEN SATURATION: 99 % | SYSTOLIC BLOOD PRESSURE: 115 MMHG | HEIGHT: 59 IN | WEIGHT: 147.63 LBS | HEART RATE: 87 BPM | BODY MASS INDEX: 29.76 KG/M2 | TEMPERATURE: 98 F

## 2024-01-04 DIAGNOSIS — Z30.46 ENCOUNTER FOR NEXPLANON REMOVAL: Primary | ICD-10-CM

## 2024-01-04 DIAGNOSIS — Z11.3 ENCOUNTER FOR SCREENING FOR INFECTIONS WITH A PREDOMINANTLY SEXUAL MODE OF TRANSMISSION: ICD-10-CM

## 2024-01-04 DIAGNOSIS — Z30.013 ENCOUNTER FOR INITIAL PRESCRIPTION OF INJECTABLE CONTRACEPTIVE: ICD-10-CM

## 2024-01-04 DIAGNOSIS — Z72.51 HIGH RISK HETEROSEXUAL BEHAVIOR: ICD-10-CM

## 2024-01-04 LAB
B-HCG UR QL: NEGATIVE
CANDIDA SPECIES: NEGATIVE
CTP QC/QA: YES
GARDNERELLA: POSITIVE
TRICHOMONAS: NEGATIVE

## 2024-01-04 PROCEDURE — 87510 GARDNER VAG DNA DIR PROBE: CPT | Mod: ,,, | Performed by: CLINICAL MEDICAL LABORATORY

## 2024-01-04 PROCEDURE — 99499 UNLISTED E&M SERVICE: CPT | Mod: ,,, | Performed by: ADVANCED PRACTICE MIDWIFE

## 2024-01-04 PROCEDURE — 11982 REMOVE DRUG IMPLANT DEVICE: CPT | Mod: ,,, | Performed by: ADVANCED PRACTICE MIDWIFE

## 2024-01-04 PROCEDURE — 87491 CHLMYD TRACH DNA AMP PROBE: CPT | Mod: ,,, | Performed by: CLINICAL MEDICAL LABORATORY

## 2024-01-04 PROCEDURE — 87660 TRICHOMONAS VAGIN DIR PROBE: CPT | Mod: ,,, | Performed by: CLINICAL MEDICAL LABORATORY

## 2024-01-04 PROCEDURE — 81025 URINE PREGNANCY TEST: CPT | Mod: QW,,, | Performed by: ADVANCED PRACTICE MIDWIFE

## 2024-01-04 PROCEDURE — 96372 THER/PROPH/DIAG INJ SC/IM: CPT | Mod: ,,, | Performed by: ADVANCED PRACTICE MIDWIFE

## 2024-01-04 PROCEDURE — 87591 N.GONORRHOEAE DNA AMP PROB: CPT | Mod: ,,, | Performed by: CLINICAL MEDICAL LABORATORY

## 2024-01-04 PROCEDURE — 87480 CANDIDA DNA DIR PROBE: CPT | Mod: ,,, | Performed by: CLINICAL MEDICAL LABORATORY

## 2024-01-04 RX ORDER — MEDROXYPROGESTERONE ACETATE 150 MG/ML
150 INJECTION, SUSPENSION INTRAMUSCULAR ONCE
Status: COMPLETED | OUTPATIENT
Start: 2024-01-04 | End: 2024-01-04

## 2024-01-04 RX ADMIN — MEDROXYPROGESTERONE ACETATE 150 MG: 150 INJECTION, SUSPENSION INTRAMUSCULAR at 02:01

## 2024-01-04 NOTE — PROCEDURES
Removal of Nexplanon Device    Date/Time: 1/4/2024 1:15 PM    Performed by: Demi Londono CNM  Authorized by: Demi Londono CNM    Consent obtained:  Prior to procedure the appropriate consent was completed and verified  Consent given by:  Patient  Procedure risks and benefits discussed: yes    Patient questions answered: yes    Patient agrees, verbalizes understanding, and wants to proceed: yes    Educational handouts given: yes    Instructions and paperwork completed: yes    Implant grasped by: hemostat  Removal due to infection and inflammatory reaction: no    Removal due to mechanical complications of IUD/Nexplanon: no    Removed with no complications: yes     Nexplanon in place since 10/17/2019, done at [facility]. UPT done per request, negative. Desires depo injections for contraceptive. Requested STD testing. Affirm swab self collected.  Urine send for GC/CT testing.  Will call or send My Chart message after results reviewed  Encouraged use of condoms use for next 7 days      Removal due to expiration: yes    Arm: left arm  Palpation confirms location: yes  Small stab incision was made in arm: yes  Upon removal device was intact: yes  Site was close with steri-strips and pressure bandage applied: yes  Pre-procedure timeout performed: yes  Prepped with:  povidone-iodine 7.5% surgical scrub and alcohol 70%  Local anesthetic:  Lidocaine with epinephrine   The site was cleaned  and prepped in a sterile fashion: yes  Specimen sent to pathology: Yes  (No specimen sent to pathology)

## 2024-01-05 DIAGNOSIS — N76.0 BACTERIAL VAGINAL INFECTION: Primary | ICD-10-CM

## 2024-01-05 DIAGNOSIS — B96.89 BACTERIAL VAGINAL INFECTION: Primary | ICD-10-CM

## 2024-01-05 LAB
CHLAMYDIA BY PCR: NEGATIVE
N. GONORRHOEAE (GC) BY PCR: NEGATIVE

## 2024-01-05 RX ORDER — METRONIDAZOLE 500 MG/1
500 TABLET ORAL 2 TIMES DAILY
Qty: 14 TABLET | Refills: 0 | Status: SHIPPED | OUTPATIENT
Start: 2024-01-05 | End: 2024-01-12

## 2024-01-05 RX ORDER — FLUCONAZOLE 150 MG/1
150 TABLET ORAL
Qty: 2 TABLET | Refills: 0 | Status: SHIPPED | OUTPATIENT
Start: 2024-01-05 | End: 2024-01-13

## 2024-01-15 ENCOUNTER — HOSPITAL ENCOUNTER (EMERGENCY)
Facility: HOSPITAL | Age: 24
Discharge: HOME | End: 2024-01-15
Payer: COMMERCIAL

## 2024-01-15 VITALS
TEMPERATURE: 99 F | HEIGHT: 59 IN | DIASTOLIC BLOOD PRESSURE: 79 MMHG | HEART RATE: 115 BPM | RESPIRATION RATE: 17 BRPM | OXYGEN SATURATION: 100 % | WEIGHT: 144 LBS | BODY MASS INDEX: 29.03 KG/M2 | SYSTOLIC BLOOD PRESSURE: 121 MMHG

## 2024-01-15 DIAGNOSIS — J02.9 PHARYNGITIS, UNSPECIFIED ETIOLOGY: Primary | ICD-10-CM

## 2024-01-15 PROCEDURE — 99284 EMERGENCY DEPT VISIT MOD MDM: CPT | Mod: ,,, | Performed by: NURSE PRACTITIONER

## 2024-01-15 PROCEDURE — 99283 EMERGENCY DEPT VISIT LOW MDM: CPT

## 2024-01-15 RX ORDER — CHLORPHENIRAMINE MALEATE AND PHENYLEPHRINE HYDROCHLORIDE 4; 10 MG/1; MG/1
1 TABLET, COATED ORAL EVERY 6 HOURS PRN
Qty: 20 TABLET | Refills: 0 | Status: SHIPPED | OUTPATIENT
Start: 2024-01-15 | End: 2024-01-25

## 2024-01-15 RX ORDER — AZITHROMYCIN 250 MG/1
250 TABLET, FILM COATED ORAL DAILY
Qty: 6 TABLET | Refills: 0 | Status: SHIPPED | OUTPATIENT
Start: 2024-01-15

## 2024-01-15 NOTE — ED PROVIDER NOTES
Encounter Date: 1/15/2024       History     Chief Complaint   Patient presents with    Sore Throat     24 y/o AAF presents to the emergency department with c/o sore throat. She states her symptoms started about a week ago and have progressively worsened. She states it is painful when she swallows. She reports subjective chills. She states she has had no fevers that she knows of. She denies nausea, vomiting or abd pain. She has no difficulty swallowing. She has had no cough or shortness of breath. She has had nothing for her symptoms. She denies nicotine, alcohol or illicit drug use.    The history is provided by the patient.     Review of patient's allergies indicates:  No Known Allergies  Past Medical History:   Diagnosis Date    No known health problems      No past surgical history on file.  No family history on file.  Social History     Tobacco Use    Smoking status: Never     Passive exposure: Current    Smokeless tobacco: Never   Substance Use Topics    Alcohol use: Not Currently    Drug use: Not Currently     Review of Systems   All other systems reviewed and are negative.      Physical Exam     Initial Vitals [01/15/24 0935]   BP Pulse Resp Temp SpO2   121/79 (!) 115 17 99.1 °F (37.3 °C) 100 %      MAP       --         Physical Exam    Constitutional: She appears well-developed and well-nourished. She is active and cooperative.  Non-toxic appearance.   HENT:   Nose: Nose normal.   Mouth/Throat: Mucous membranes are normal. Posterior oropharyngeal erythema present.   Cardiovascular:  Regular rhythm, normal heart sounds and normal pulses.   Tachycardia present.         Pulmonary/Chest: Effort normal and breath sounds normal.   Abdominal: Abdomen is soft. Bowel sounds are normal. There is no abdominal tenderness.     Neurological: She is alert and oriented to person, place, and time.   Skin: Skin is warm, dry and intact. Capillary refill takes less than 2 seconds.         Medical Screening Exam   See Full  Note    ED Course   Procedures  Labs Reviewed - No data to display       Imaging Results    None          Medications - No data to display  Medical Decision Making  22 y/o BURKE presents to the emergency department with c/o sore throat. She states her symptoms started about a week ago and have progressively worsened. She states it is painful when she swallows. She reports subjective chills. She states she has had no fevers that she knows of. She denies nausea, vomiting or abd pain. She has no difficulty swallowing. She has had no cough or shortness of breath. She has had nothing for her symptoms. She denies nicotine, alcohol or illicit drug use.    Problems Addressed:  Pharyngitis, unspecified etiology:     Details: Pt non toxic appearing. Rx for Zpack, Ed ahist. Counseled on use and supportive measures. Follow up instructions given. Warning s/s discussed and return precautions given; the patient has v/u.    Risk  OTC drugs.  Prescription drug management.                                      Clinical Impression:   Final diagnoses:  [J02.9] Pharyngitis, unspecified etiology (Primary)        ED Disposition Condition    Discharge Stable          ED Prescriptions       Medication Sig Dispense Start Date End Date Auth. Provider    azithromycin (Z-GEORGE) 250 MG tablet Take 1 tablet (250 mg total) by mouth once daily. Take first 2 tablets together, then 1 every day until finished. 6 tablet 1/15/2024 -- Emelina Estrada FNP    chlorpheniramine-phenylephrine (ED A-HIST) 4-10 mg per tablet Take 1 tablet by mouth every 6 (six) hours as needed for Congestion. 20 tablet 1/15/2024 1/25/2024 Emelina Estrada FNP          Follow-up Information       Follow up With Specialties Details Why Contact Info    Primary Care Provider   As needed              Emelina Estrada FNP  01/15/24 [id number]

## 2024-01-15 NOTE — DISCHARGE INSTRUCTIONS
Use prescriptions as directed. Alternate tylenol and ibuprofen as needed for pain/fever. Ensure you are drinking plenty of fluids, especially water. Warm salt water gargles as needed. Follow up with your primary care provider if no improvement or otherwise as needed. Return to the ED for worsening signs and symptoms or otherwise as needed.

## 2024-01-15 NOTE — Clinical Note
"Nikki Peterson" Olesya was seen and treated in our emergency department on 1/15/2024.  She may return to work on 01/16/2024.       If you have any questions or concerns, please don't hesitate to call.      Emelina Estrada, FLORENTINO"

## 2024-03-22 ENCOUNTER — OFFICE VISIT (OUTPATIENT)
Dept: OBSTETRICS AND GYNECOLOGY | Facility: CLINIC | Age: 24
End: 2024-03-22
Payer: COMMERCIAL

## 2024-03-22 VITALS
TEMPERATURE: 98 F | WEIGHT: 150.19 LBS | OXYGEN SATURATION: 99 % | RESPIRATION RATE: 17 BRPM | SYSTOLIC BLOOD PRESSURE: 106 MMHG | BODY MASS INDEX: 30.28 KG/M2 | HEART RATE: 75 BPM | HEIGHT: 59 IN | DIASTOLIC BLOOD PRESSURE: 71 MMHG

## 2024-03-22 DIAGNOSIS — Z72.51 HIGH RISK HETEROSEXUAL BEHAVIOR: ICD-10-CM

## 2024-03-22 DIAGNOSIS — Z86.19 HISTORY OF SEXUALLY TRANSMITTED DISEASE: ICD-10-CM

## 2024-03-22 DIAGNOSIS — Z30.42 SURVEILLANCE OF CONTRACEPTIVE INJECTION: ICD-10-CM

## 2024-03-22 DIAGNOSIS — Z01.419 WOMEN'S ANNUAL ROUTINE GYNECOLOGICAL EXAMINATION: Primary | ICD-10-CM

## 2024-03-22 DIAGNOSIS — Z12.4 ENCOUNTER FOR PAPANICOLAOU SMEAR FOR CERVICAL CANCER SCREENING: ICD-10-CM

## 2024-03-22 DIAGNOSIS — Z11.3 ENCOUNTER FOR SCREENING FOR INFECTIONS WITH A PREDOMINANTLY SEXUAL MODE OF TRANSMISSION: ICD-10-CM

## 2024-03-22 LAB
CANDIDA SPECIES: POSITIVE
GARDNERELLA: POSITIVE
TRICHOMONAS: NEGATIVE

## 2024-03-22 PROCEDURE — 3078F DIAST BP <80 MM HG: CPT | Mod: CPTII,,, | Performed by: ADVANCED PRACTICE MIDWIFE

## 2024-03-22 PROCEDURE — 87660 TRICHOMONAS VAGIN DIR PROBE: CPT | Mod: ,,, | Performed by: CLINICAL MEDICAL LABORATORY

## 2024-03-22 PROCEDURE — 3074F SYST BP LT 130 MM HG: CPT | Mod: CPTII,,, | Performed by: ADVANCED PRACTICE MIDWIFE

## 2024-03-22 PROCEDURE — 99395 PREV VISIT EST AGE 18-39: CPT | Mod: 25,,, | Performed by: ADVANCED PRACTICE MIDWIFE

## 2024-03-22 PROCEDURE — 87591 N.GONORRHOEAE DNA AMP PROB: CPT | Mod: ,,, | Performed by: CLINICAL MEDICAL LABORATORY

## 2024-03-22 PROCEDURE — 87491 CHLMYD TRACH DNA AMP PROBE: CPT | Mod: ,,, | Performed by: CLINICAL MEDICAL LABORATORY

## 2024-03-22 PROCEDURE — 96372 THER/PROPH/DIAG INJ SC/IM: CPT | Mod: ,,, | Performed by: ADVANCED PRACTICE MIDWIFE

## 2024-03-22 PROCEDURE — 87480 CANDIDA DNA DIR PROBE: CPT | Mod: ,,, | Performed by: CLINICAL MEDICAL LABORATORY

## 2024-03-22 PROCEDURE — 3008F BODY MASS INDEX DOCD: CPT | Mod: CPTII,,, | Performed by: ADVANCED PRACTICE MIDWIFE

## 2024-03-22 PROCEDURE — 88142 CYTOPATH C/V THIN LAYER: CPT | Mod: TC,GCY | Performed by: ADVANCED PRACTICE MIDWIFE

## 2024-03-22 PROCEDURE — 87510 GARDNER VAG DNA DIR PROBE: CPT | Mod: ,,, | Performed by: CLINICAL MEDICAL LABORATORY

## 2024-03-22 PROCEDURE — 1159F MED LIST DOCD IN RCRD: CPT | Mod: CPTII,,, | Performed by: ADVANCED PRACTICE MIDWIFE

## 2024-03-22 RX ORDER — MEDROXYPROGESTERONE ACETATE 150 MG/ML
150 INJECTION, SUSPENSION INTRAMUSCULAR ONCE
Status: COMPLETED | OUTPATIENT
Start: 2024-03-22 | End: 2024-03-22

## 2024-03-22 RX ADMIN — MEDROXYPROGESTERONE ACETATE 150 MG: 150 INJECTION, SUSPENSION INTRAMUSCULAR at 02:03

## 2024-03-23 LAB
CHLAMYDIA BY PCR: NEGATIVE
N. GONORRHOEAE (GC) BY PCR: NEGATIVE

## 2024-03-24 DIAGNOSIS — B37.31 YEAST VAGINITIS: ICD-10-CM

## 2024-03-24 DIAGNOSIS — B96.89 BACTERIAL VAGINAL INFECTION: Primary | ICD-10-CM

## 2024-03-24 DIAGNOSIS — N76.0 BACTERIAL VAGINAL INFECTION: Primary | ICD-10-CM

## 2024-03-24 PROBLEM — N93.9 VAGINAL SPOTTING: Status: RESOLVED | Noted: 2023-05-11 | Resolved: 2024-03-24

## 2024-03-24 PROBLEM — L02.215 PERINEAL ABSCESS: Status: RESOLVED | Noted: 2023-03-13 | Resolved: 2024-03-24

## 2024-03-24 RX ORDER — METRONIDAZOLE 500 MG/1
500 TABLET ORAL 2 TIMES DAILY
Qty: 14 TABLET | Refills: 0 | Status: SHIPPED | OUTPATIENT
Start: 2024-03-24 | End: 2024-03-31

## 2024-03-24 RX ORDER — FLUCONAZOLE 100 MG/1
100 TABLET ORAL
Qty: 3 TABLET | Refills: 0 | Status: SHIPPED | OUTPATIENT
Start: 2024-03-24 | End: 2024-03-31

## 2024-03-25 LAB
GH SERPL-MCNC: NORMAL NG/ML
INSULIN SERPL-ACNC: NORMAL U[IU]/ML
LAB AP CLINICAL INFORMATION: NORMAL
LAB AP GYN INTERPRETATION: NEGATIVE
LAB AP PAP DISCLAIMER COMMENTS: NORMAL
RENIN PLAS-CCNC: NORMAL NG/ML/H

## 2024-03-25 NOTE — PROGRESS NOTES
"  Patient ID: Nikki Dacosta is a 23 y.o. female     Chief Complaint:   Chief Complaint   Patient presents with    Annual Exam     With pap    Contraception     Depo    Vaginal Discharge       HPI: Nikki Dacosta is a 23 y.o. female who presents for well woman exam with initial Pap and repeat depo injection. Has c/o vaginal discharge, denies ACHES and abnormal vag bleeding.   LMP: No LMP recorded. Patient has had an injection.  Sexually active: yes, desires STD testing  Contraceptive: depo  Mammogram: n/a    Past Medical History:   Diagnosis Date    No known health problems     Perineal abscess 2023       History reviewed. No pertinent surgical history.    Social History     Socioeconomic History    Marital status: Single   Tobacco Use    Smoking status: Never     Passive exposure: Current    Smokeless tobacco: Never   Substance and Sexual Activity    Alcohol use: Not Currently    Drug use: Not Currently    Sexual activity: Yes     Partners: Male       History reviewed. No pertinent family history.    OB History          0    Para   0    Term   0       0    AB   0    Living   0         SAB   0    IAB   0    Ectopic   0    Multiple   0    Live Births   0                 /71 (BP Location: Right arm)   Pulse 75   Temp 98.3 °F (36.8 °C)   Resp 17   Ht 4' 11" (1.499 m)   Wt 68.1 kg (150 lb 3.2 oz)   SpO2 99%   BMI 30.34 kg/m²     Wt Readings from Last 3 Encounters:   24 68.1 kg (150 lb 3.2 oz)   01/15/24 65.3 kg (144 lb)   24 67 kg (147 lb 9.6 oz)        ROS:  Review of Systems   Constitutional: Negative.    Eyes: Negative.    Respiratory: Negative.     Cardiovascular: Negative.    Gastrointestinal: Negative.    Endocrine: Negative.    Genitourinary:  Positive for vaginal discharge.   Musculoskeletal: Negative.    Integumentary:  Negative.   Neurological: Negative.    Hematological: Negative.    Psychiatric/Behavioral: Negative.     Breast: negative.         Physical Exam "   GENERAL: Well-developed, well-nourished female in no acute distress.  NECK: Supple with full range of motion. No adenopathy, masses, or thyromegaly.  SKIN: Normal to inspection with no rashes, lesions, or ulcers.  LUNGS: Clear bilaterally with no rales, rhonchi, or wheezes. Normal effect.  CARDIOVASCULAR AUSCULTATION: Normal with regular rate and rythm  BREASTS: No masses, lumps, discharge, tenderness, or fibrocystic changes.  LYMPH NODE SURVEY: No supraclavicular, axillary, or inguinal adenopathy.  ABDOMEN: Soft, non tender, without masses. No palpable hernia. No hepatosplenomegaly or hernias.  VULVA: General appearance normal; external genitalia with no lesions.  URETHRA: Normal size and location with no lesions or prolapse.  BLADDER: No masses or tenderness.  VAGINA: Mucosa normal, thin white vaginal discharge noted  CERVIX: Grossly normal with normal size.  RECTOVAGINAL EXAM: No masses, nodularity, or hemorrhoids. Anus and perineum normal; no lesions or relaxation.   PERIPHERAL LOWER EXTREMITIES: No edema, no tenderness  PSYCHIATRIC: Patient is oriented to person, place, and time. Mood and affect are nor      Assessment:  Women's annual routine gynecological examination  -     ThinPrep Pap Test; Future; Expected date: 03/22/2024    Encounter for screening for infections with a predominantly sexual mode of transmission  -     Chlamydia/GC, PCR; Future; Expected date: 03/22/2024  -     Bacterial Vaginosis; Future; Expected date: 03/22/2024    High risk heterosexual behavior  -     Chlamydia/GC, PCR; Future; Expected date: 03/22/2024  -     Bacterial Vaginosis; Future; Expected date: 03/22/2024    Surveillance of contraceptive injection  -     medroxyPROGESTERone (DEPO-PROVERA) syringe 150 mg    Encounter for Papanicolaou smear for cervical cancer screening  -     ThinPrep Pap Test; Future; Expected date: 03/22/2024    BMI 30.0-30.9,adult    History of sexually transmitted disease  -     Chlamydia/GC, PCR;  Future; Expected date: 03/22/2024  -     Bacterial Vaginosis; Future; Expected date: 03/22/2024          ICD-10-CM ICD-9-CM    1. Women's annual routine gynecological examination  Z01.419 V72.31 ThinPrep Pap Test      ThinPrep Pap Test      2. Encounter for screening for infections with a predominantly sexual mode of transmission  Z11.3 V74.5 Chlamydia/GC, PCR      Bacterial Vaginosis      Chlamydia/GC, PCR      Bacterial Vaginosis      3. High risk heterosexual behavior  Z72.51 V69.2 Chlamydia/GC, PCR      Bacterial Vaginosis      Chlamydia/GC, PCR      Bacterial Vaginosis      4. Surveillance of contraceptive injection  Z30.42 V25.49 medroxyPROGESTERone (DEPO-PROVERA) syringe 150 mg      5. Encounter for Papanicolaou smear for cervical cancer screening  Z12.4 V76.2 ThinPrep Pap Test      ThinPrep Pap Test      6. BMI 30.0-30.9,adult  Z68.30 V85.30       7. History of sexually transmitted disease  Z86.19 V12.09 Chlamydia/GC, PCR      Bacterial Vaginosis      Chlamydia/GC, PCR      Bacterial Vaginosis          Plan:  Annual exam completed, Pap and Affirm specimens obtained  Will call or send My Chart message after results reviewed  Patient was counseled today on ASCCP Pap guidelines and recommendations for yearly pelvic exams   Encouraged healthy dietary choices, increasing water intake, and exercising at least 3 x weekly  Depo injection 150 mg given IM  Encouraged use of condoms to decrease STD exposure risk    Follow up in about 1 year (around 3/22/2025) for annual gyn exam.

## 2024-06-10 ENCOUNTER — CLINICAL SUPPORT (OUTPATIENT)
Dept: OBSTETRICS AND GYNECOLOGY | Facility: CLINIC | Age: 24
End: 2024-06-10
Payer: COMMERCIAL

## 2024-06-10 VITALS
HEART RATE: 75 BPM | WEIGHT: 140.38 LBS | HEIGHT: 59 IN | BODY MASS INDEX: 28.3 KG/M2 | SYSTOLIC BLOOD PRESSURE: 109 MMHG | RESPIRATION RATE: 17 BRPM | OXYGEN SATURATION: 99 % | DIASTOLIC BLOOD PRESSURE: 70 MMHG

## 2024-06-10 DIAGNOSIS — Z30.42 SURVEILLANCE OF CONTRACEPTIVE INJECTION: Primary | ICD-10-CM

## 2024-06-10 RX ORDER — MEDROXYPROGESTERONE ACETATE 150 MG/ML
150 INJECTION, SUSPENSION INTRAMUSCULAR
Status: COMPLETED | OUTPATIENT
Start: 2024-06-10 | End: 2024-06-10

## 2024-06-10 RX ADMIN — MEDROXYPROGESTERONE ACETATE 150 MG: 150 INJECTION, SUSPENSION INTRAMUSCULAR at 01:06

## 2024-06-10 NOTE — PROGRESS NOTES
Depo Provera 150 MG given IM in right dorsa gluteal.  Date last pap: 3/22/2024.  Last Depo-Provera: 3/22/2024.  Side Effects if any: None.  Serum HCG indicated? N/A.  Depo-Provera 150 mg IM given by: Lawanda Trujillo LPN.  Next appointment due 8/26/2024-9/9/2024.

## 2024-08-26 ENCOUNTER — OFFICE VISIT (OUTPATIENT)
Dept: OBSTETRICS AND GYNECOLOGY | Facility: CLINIC | Age: 24
End: 2024-08-26
Payer: COMMERCIAL

## 2024-08-26 VITALS
HEART RATE: 75 BPM | SYSTOLIC BLOOD PRESSURE: 109 MMHG | DIASTOLIC BLOOD PRESSURE: 73 MMHG | OXYGEN SATURATION: 99 % | WEIGHT: 148.38 LBS | HEIGHT: 59 IN | BODY MASS INDEX: 29.91 KG/M2 | RESPIRATION RATE: 17 BRPM

## 2024-08-26 DIAGNOSIS — E66.3 OVERWEIGHT WITH BODY MASS INDEX (BMI) OF 29 TO 29.9 IN ADULT: ICD-10-CM

## 2024-08-26 DIAGNOSIS — Z30.42 SURVEILLANCE OF CONTRACEPTIVE INJECTION: Primary | ICD-10-CM

## 2024-08-26 PROCEDURE — 3074F SYST BP LT 130 MM HG: CPT | Mod: CPTII,,, | Performed by: ADVANCED PRACTICE MIDWIFE

## 2024-08-26 PROCEDURE — 96372 THER/PROPH/DIAG INJ SC/IM: CPT | Mod: ,,, | Performed by: ADVANCED PRACTICE MIDWIFE

## 2024-08-26 PROCEDURE — 3008F BODY MASS INDEX DOCD: CPT | Mod: CPTII,,, | Performed by: ADVANCED PRACTICE MIDWIFE

## 2024-08-26 PROCEDURE — 1159F MED LIST DOCD IN RCRD: CPT | Mod: CPTII,,, | Performed by: ADVANCED PRACTICE MIDWIFE

## 2024-08-26 PROCEDURE — 3078F DIAST BP <80 MM HG: CPT | Mod: CPTII,,, | Performed by: ADVANCED PRACTICE MIDWIFE

## 2024-08-26 PROCEDURE — 99213 OFFICE O/P EST LOW 20 MIN: CPT | Mod: 25,,, | Performed by: ADVANCED PRACTICE MIDWIFE

## 2024-08-26 RX ORDER — MEDROXYPROGESTERONE ACETATE 150 MG/ML
150 INJECTION, SUSPENSION INTRAMUSCULAR ONCE
Status: COMPLETED | OUTPATIENT
Start: 2024-08-26 | End: 2024-08-26

## 2024-08-26 RX ADMIN — MEDROXYPROGESTERONE ACETATE 150 MG: 150 INJECTION, SUSPENSION INTRAMUSCULAR at 10:08

## 2024-08-26 NOTE — PROGRESS NOTES
"  Patient ID:  Nikki Dacosta is a 23 y.o. female      Chief Complaint:   Chief Complaint   Patient presents with    Contraception     Patient is here for depo        HPI:  Nikki Dacosta is in for repeat depo injection within dates. Denies ACHES, vag discharge, and abnormal vag bleeding. No issues, problems, or complaints. Desires to continue depo injections.   LMP: No LMP recorded. Patient has had an injection.  Sexually active: yes, declines STD testing    Past Medical History:   Diagnosis Date    No known health problems     Perineal abscess 2023       History reviewed. No pertinent surgical history.    OB History          0    Para   0    Term   0       0    AB   0    Living   0         SAB   0    IAB   0    Ectopic   0    Multiple   0    Live Births   0                 /73 (BP Location: Right arm, Patient Position: Sitting)   Pulse 75   Resp 17   Ht 4' 11" (1.499 m)   Wt 67.3 kg (148 lb 6.4 oz)   SpO2 99%   BMI 29.97 kg/m²   Wt Readings from Last 3 Encounters:   24 67.3 kg (148 lb 6.4 oz)   06/10/24 63.7 kg (140 lb 6.4 oz)   24 68.1 kg (150 lb 3.2 oz)          ROS:  Review of Systems   Constitutional: Negative.    Eyes: Negative.    Respiratory: Negative.     Cardiovascular: Negative.    Gastrointestinal: Negative.    Genitourinary: Negative.    Musculoskeletal: Negative.    Neurological: Negative.    Psychiatric/Behavioral: Negative.            PHYSICAL EXAM:  Physical Exam  Constitutional:       Appearance: Normal appearance. She is normal weight.   Eyes:      Extraocular Movements: Extraocular movements intact.   Cardiovascular:      Rate and Rhythm: Normal rate.      Pulses: Normal pulses.   Pulmonary:      Effort: Pulmonary effort is normal.   Musculoskeletal:         General: Normal range of motion.      Cervical back: Normal range of motion.   Skin:     General: Skin is warm and dry.   Neurological:      Mental Status: She is alert and oriented to person, " "place, and time.   Psychiatric:         Mood and Affect: Mood normal.         Behavior: Behavior normal.         Thought Content: Thought content normal.         Judgment: Judgment normal.          Assessment:  Surveillance of contraceptive injection  -     medroxyPROGESTERone (DEPO-PROVERA) syringe 150 mg    Overweight with body mass index (BMI) of 29 to 29.9 in adult          ICD-10-CM ICD-9-CM    1. Surveillance of contraceptive injection  Z30.42 V25.49 medroxyPROGESTERone (DEPO-PROVERA) syringe 150 mg      2. Overweight with body mass index (BMI) of 29 to 29.9 in adult  E66.3 278.02     Z68.29 V85.25           Plan:  Depo Provera 150 mg IM given, Reviewed "ACHES" of contraceptives and possible S/E  Encouraged Vitamin D and calcium supplements or daily multivitamin  Instructed on condom use during each sexual encounter to decrease STD exposure risk    Follow up in about 3 months (around 11/26/2024) for repeat depo injection.                    "

## 2024-11-11 ENCOUNTER — OFFICE VISIT (OUTPATIENT)
Dept: OBSTETRICS AND GYNECOLOGY | Facility: CLINIC | Age: 24
End: 2024-11-11
Payer: COMMERCIAL

## 2024-11-11 VITALS
SYSTOLIC BLOOD PRESSURE: 91 MMHG | HEIGHT: 59 IN | OXYGEN SATURATION: 99 % | RESPIRATION RATE: 17 BRPM | HEART RATE: 76 BPM | DIASTOLIC BLOOD PRESSURE: 62 MMHG | BODY MASS INDEX: 31.04 KG/M2 | WEIGHT: 154 LBS

## 2024-11-11 DIAGNOSIS — Z11.3 ENCOUNTER FOR SPECIAL SCREENING EXAMINATION FOR INFECTION WITH PREDOMINANTLY SEXUAL MODE OF TRANSMISSION: ICD-10-CM

## 2024-11-11 DIAGNOSIS — N76.0 ACUTE VAGINITIS: ICD-10-CM

## 2024-11-11 DIAGNOSIS — Z30.42 SURVEILLANCE OF CONTRACEPTIVE INJECTION: Primary | ICD-10-CM

## 2024-11-11 DIAGNOSIS — Z72.51 HIGH RISK HETEROSEXUAL BEHAVIOR: ICD-10-CM

## 2024-11-11 DIAGNOSIS — E66.811 OBESITY (BMI 30.0-34.9): ICD-10-CM

## 2024-11-11 LAB
CANDIDA SPECIES: NEGATIVE
GARDNERELLA: POSITIVE
TRICHOMONAS: NEGATIVE

## 2024-11-11 PROCEDURE — 87660 TRICHOMONAS VAGIN DIR PROBE: CPT | Mod: ,,, | Performed by: CLINICAL MEDICAL LABORATORY

## 2024-11-11 PROCEDURE — 3078F DIAST BP <80 MM HG: CPT | Mod: CPTII,,, | Performed by: ADVANCED PRACTICE MIDWIFE

## 2024-11-11 PROCEDURE — 96372 THER/PROPH/DIAG INJ SC/IM: CPT | Mod: ,,, | Performed by: ADVANCED PRACTICE MIDWIFE

## 2024-11-11 PROCEDURE — 87591 N.GONORRHOEAE DNA AMP PROB: CPT | Mod: ,,, | Performed by: CLINICAL MEDICAL LABORATORY

## 2024-11-11 PROCEDURE — 3008F BODY MASS INDEX DOCD: CPT | Mod: CPTII,,, | Performed by: ADVANCED PRACTICE MIDWIFE

## 2024-11-11 PROCEDURE — 87510 GARDNER VAG DNA DIR PROBE: CPT | Mod: ,,, | Performed by: CLINICAL MEDICAL LABORATORY

## 2024-11-11 PROCEDURE — 99213 OFFICE O/P EST LOW 20 MIN: CPT | Mod: 25,,, | Performed by: ADVANCED PRACTICE MIDWIFE

## 2024-11-11 PROCEDURE — 87480 CANDIDA DNA DIR PROBE: CPT | Mod: ,,, | Performed by: CLINICAL MEDICAL LABORATORY

## 2024-11-11 PROCEDURE — 3074F SYST BP LT 130 MM HG: CPT | Mod: CPTII,,, | Performed by: ADVANCED PRACTICE MIDWIFE

## 2024-11-11 PROCEDURE — 1159F MED LIST DOCD IN RCRD: CPT | Mod: CPTII,,, | Performed by: ADVANCED PRACTICE MIDWIFE

## 2024-11-11 PROCEDURE — 87491 CHLMYD TRACH DNA AMP PROBE: CPT | Mod: ,,, | Performed by: CLINICAL MEDICAL LABORATORY

## 2024-11-11 RX ORDER — MEDROXYPROGESTERONE ACETATE 150 MG/ML
150 INJECTION, SUSPENSION INTRAMUSCULAR ONCE
Status: COMPLETED | OUTPATIENT
Start: 2024-11-11 | End: 2024-11-11

## 2024-11-11 RX ADMIN — MEDROXYPROGESTERONE ACETATE 150 MG: 150 INJECTION, SUSPENSION INTRAMUSCULAR at 10:11

## 2024-11-11 NOTE — PROGRESS NOTES
"  Patient ID:  Nikki Dacosta is a 24 y.o. female      Chief Complaint:   Chief Complaint   Patient presents with    Contraception     Depo    STD CHECK        HPI:  Nikki Dacosta is in for repeat depo injection within dates. Denies ACHES and abnormal vaginal bleeding. C/O vaginal odor and itching. Reports unprotected sexual encounter. Requesting STD testing.   LMP: No LMP recorded. Patient has had an injection.  Sexually active: yes    Past Medical History:   Diagnosis Date    No known health problems     Perineal abscess 2023       History reviewed. No pertinent surgical history.    OB History          0    Para   0    Term   0       0    AB   0    Living   0         SAB   0    IAB   0    Ectopic   0    Multiple   0    Live Births   0                 BP 91/62 (BP Location: Left arm, Patient Position: Sitting)   Pulse 76   Resp 17   Ht 4' 11" (1.499 m)   Wt 69.9 kg (154 lb)   SpO2 99%   BMI 31.10 kg/m²   Wt Readings from Last 3 Encounters:   24 69.9 kg (154 lb)   24 67.3 kg (148 lb 6.4 oz)   06/10/24 63.7 kg (140 lb 6.4 oz)          ROS:  Review of Systems   Constitutional: Negative.    Eyes: Negative.    Respiratory: Negative.     Cardiovascular: Negative.    Gastrointestinal: Negative.    Genitourinary: Negative.  Positive for vaginal odor.   Musculoskeletal: Negative.    Neurological: Negative.    Psychiatric/Behavioral: Negative.            PHYSICAL EXAM:  Physical Exam  Constitutional:       Appearance: Normal appearance. She is obese.   Eyes:      Extraocular Movements: Extraocular movements intact.   Cardiovascular:      Rate and Rhythm: Normal rate.      Pulses: Normal pulses.   Pulmonary:      Effort: Pulmonary effort is normal.   Musculoskeletal:         General: Normal range of motion.      Cervical back: Normal range of motion.   Skin:     General: Skin is warm and dry.   Neurological:      Mental Status: She is alert and oriented to person, place, and time. " "  Psychiatric:         Mood and Affect: Mood normal.         Behavior: Behavior normal.         Thought Content: Thought content normal.         Judgment: Judgment normal.          Assessment:  Surveillance of contraceptive injection  -     medroxyPROGESTERone (DEPO-PROVERA) syringe 150 mg    Acute vaginitis  -     Bacterial Vaginosis; Future; Expected date: 11/11/2024  -     Chlamydia/GC, PCR; Future; Expected date: 11/11/2024    Obesity (BMI 30.0-34.9)    High risk heterosexual behavior  -     Bacterial Vaginosis; Future; Expected date: 11/11/2024  -     Chlamydia/GC, PCR; Future; Expected date: 11/11/2024    Encounter for special screening examination for infection with predominantly sexual mode of transmission  -     Bacterial Vaginosis; Future; Expected date: 11/11/2024  -     Chlamydia/GC, PCR; Future; Expected date: 11/11/2024          ICD-10-CM ICD-9-CM    1. Surveillance of contraceptive injection  Z30.42 V25.49 medroxyPROGESTERone (DEPO-PROVERA) syringe 150 mg      2. Acute vaginitis  N76.0 616.10 Bacterial Vaginosis      Chlamydia/GC, PCR      Bacterial Vaginosis      Chlamydia/GC, PCR      3. Obesity (BMI 30.0-34.9)  E66.811 278.00       4. High risk heterosexual behavior  Z72.51 V69.2 Bacterial Vaginosis      Chlamydia/GC, PCR      Bacterial Vaginosis      Chlamydia/GC, PCR      5. Encounter for special screening examination for infection with predominantly sexual mode of transmission  Z11.3 V74.5 Bacterial Vaginosis      Chlamydia/GC, PCR      Bacterial Vaginosis      Chlamydia/GC, PCR          Plan:  Affirm swab self collected  Urine send for GC/CT testing  Will call or send My Chart message after results reviewed  Encouraged use of condoms with each sexual encounter to decrease STD exposure risk  Depo Provera 150 mg IM given, Reviewed "ACHES" of contraceptives and possible S/E  Encouraged Vitamin D and calcium supplements or daily multivitamin    Follow up in about 3 months (around 2/11/2025) for " repeat depo injection.

## 2024-11-12 DIAGNOSIS — N76.0 BACTERIAL VAGINAL INFECTION: Primary | ICD-10-CM

## 2024-11-12 DIAGNOSIS — B96.89 BACTERIAL VAGINAL INFECTION: Primary | ICD-10-CM

## 2024-11-12 LAB
CHLAMYDIA BY PCR: NEGATIVE
N. GONORRHOEAE (GC) BY PCR: NEGATIVE

## 2024-11-12 RX ORDER — METRONIDAZOLE 500 MG/1
500 TABLET ORAL 2 TIMES DAILY
Qty: 14 TABLET | Refills: 0 | Status: SHIPPED | OUTPATIENT
Start: 2024-11-12 | End: 2024-11-19

## 2024-11-12 RX ORDER — FLUCONAZOLE 150 MG/1
150 TABLET ORAL
Qty: 2 TABLET | Refills: 0 | Status: SHIPPED | OUTPATIENT
Start: 2024-11-12 | End: 2024-11-20

## 2025-01-27 ENCOUNTER — OFFICE VISIT (OUTPATIENT)
Facility: CLINIC | Age: 25
End: 2025-01-27
Payer: COMMERCIAL

## 2025-01-27 VITALS
WEIGHT: 154 LBS | DIASTOLIC BLOOD PRESSURE: 70 MMHG | HEIGHT: 59 IN | SYSTOLIC BLOOD PRESSURE: 110 MMHG | OXYGEN SATURATION: 99 % | HEART RATE: 71 BPM | BODY MASS INDEX: 31.04 KG/M2 | RESPIRATION RATE: 17 BRPM

## 2025-01-27 DIAGNOSIS — E66.811 OBESITY (BMI 30.0-34.9): ICD-10-CM

## 2025-01-27 DIAGNOSIS — Z72.51 HIGH RISK HETEROSEXUAL BEHAVIOR: ICD-10-CM

## 2025-01-27 DIAGNOSIS — Z11.3 ENCOUNTER FOR SCREENING FOR INFECTIONS WITH A PREDOMINANTLY SEXUAL MODE OF TRANSMISSION: ICD-10-CM

## 2025-01-27 DIAGNOSIS — Z30.42 SURVEILLANCE OF CONTRACEPTIVE INJECTION: Primary | ICD-10-CM

## 2025-01-27 DIAGNOSIS — N89.8 VAGINAL ODOR: ICD-10-CM

## 2025-01-27 LAB
BACTERIAL VAGINOSIS DNA (OHS): NEGATIVE
CANDIDA GLABRATA/KRUSEI DNA (OHS): NOT DETECTED
CANDIDA SPECIES DNA (OHS): NOT DETECTED
CHLAMYDIA BY PCR: NEGATIVE
N. GONORRHOEAE (GC) BY PCR: NEGATIVE
TRICHOMONAS VAGINALIS DNA (OHS): NOT DETECTED

## 2025-01-27 PROCEDURE — 99213 OFFICE O/P EST LOW 20 MIN: CPT | Mod: 25,,, | Performed by: ADVANCED PRACTICE MIDWIFE

## 2025-01-27 PROCEDURE — 87591 N.GONORRHOEAE DNA AMP PROB: CPT | Mod: ,,, | Performed by: CLINICAL MEDICAL LABORATORY

## 2025-01-27 PROCEDURE — 81515 NFCT DS BV&VAGINITIS DNA ALG: CPT | Mod: ,,, | Performed by: CLINICAL MEDICAL LABORATORY

## 2025-01-27 PROCEDURE — 87491 CHLMYD TRACH DNA AMP PROBE: CPT | Mod: ,,, | Performed by: CLINICAL MEDICAL LABORATORY

## 2025-01-27 PROCEDURE — 3074F SYST BP LT 130 MM HG: CPT | Mod: CPTII,,, | Performed by: ADVANCED PRACTICE MIDWIFE

## 2025-01-27 PROCEDURE — 96372 THER/PROPH/DIAG INJ SC/IM: CPT | Mod: ,,, | Performed by: ADVANCED PRACTICE MIDWIFE

## 2025-01-27 PROCEDURE — 1159F MED LIST DOCD IN RCRD: CPT | Mod: CPTII,,, | Performed by: ADVANCED PRACTICE MIDWIFE

## 2025-01-27 PROCEDURE — 3008F BODY MASS INDEX DOCD: CPT | Mod: CPTII,,, | Performed by: ADVANCED PRACTICE MIDWIFE

## 2025-01-27 PROCEDURE — 3078F DIAST BP <80 MM HG: CPT | Mod: CPTII,,, | Performed by: ADVANCED PRACTICE MIDWIFE

## 2025-01-27 RX ORDER — MEDROXYPROGESTERONE ACETATE 150 MG/ML
150 INJECTION, SUSPENSION INTRAMUSCULAR ONCE
Status: COMPLETED | OUTPATIENT
Start: 2025-01-27 | End: 2025-01-27

## 2025-01-27 RX ADMIN — MEDROXYPROGESTERONE ACETATE 150 MG: 150 INJECTION, SUSPENSION INTRAMUSCULAR at 11:01

## 2025-01-27 NOTE — PROGRESS NOTES
"  Patient ID:  Nikki Dacosta is a 24 y.o. female      Chief Complaint:   Chief Complaint   Patient presents with    Contraception     Depo    STD CHECK        HPI:  Nikki Dacosta is in for repeat depo injection within dates. Denies ACHES, has vaginal odor. Desires to continue depo injections.   LMP: No LMP recorded. Patient has had an injection.  Sexually active: yes, desires STD testing.     Past Medical History:   Diagnosis Date    No known health problems     Perineal abscess 2023       History reviewed. No pertinent surgical history.    OB History          0    Para   0    Term   0       0    AB   0    Living   0         SAB   0    IAB   0    Ectopic   0    Multiple   0    Live Births   0                 /70 (BP Location: Right arm, Patient Position: Sitting)   Pulse 71   Resp 17   Ht 4' 11" (1.499 m)   Wt 69.9 kg (154 lb)   SpO2 99%   BMI 31.10 kg/m²   Wt Readings from Last 3 Encounters:   25 69.9 kg (154 lb)   24 69.9 kg (154 lb)   24 67.3 kg (148 lb 6.4 oz)          ROS:  Review of Systems   Constitutional: Negative.    Eyes: Negative.    Respiratory: Negative.     Cardiovascular: Negative.    Gastrointestinal: Negative.    Genitourinary: Negative.  Positive for vaginal odor.   Musculoskeletal: Negative.    Neurological: Negative.    Psychiatric/Behavioral: Negative.            PHYSICAL EXAM:  Physical Exam  Constitutional:       Appearance: Normal appearance. She is obese.   Eyes:      Extraocular Movements: Extraocular movements intact.   Cardiovascular:      Rate and Rhythm: Normal rate.      Pulses: Normal pulses.   Pulmonary:      Effort: Pulmonary effort is normal.   Musculoskeletal:         General: Normal range of motion.      Cervical back: Normal range of motion.   Skin:     General: Skin is warm and dry.   Neurological:      Mental Status: She is alert and oriented to person, place, and time.   Psychiatric:         Mood and Affect: Mood normal.    " "     Behavior: Behavior normal.         Thought Content: Thought content normal.         Judgment: Judgment normal.          Assessment:  Surveillance of contraceptive injection  -     medroxyPROGESTERone (DEPO-PROVERA) syringe 150 mg    Encounter for screening for infections with a predominantly sexual mode of transmission  -     Chlamydia/GC, PCR; Future; Expected date: 01/27/2025  -     Vaginosis Screen by DNA Probe; Future; Expected date: 01/27/2025    High risk heterosexual behavior  -     Chlamydia/GC, PCR; Future; Expected date: 01/27/2025  -     Vaginosis Screen by DNA Probe; Future; Expected date: 01/27/2025    Vaginal odor  -     Chlamydia/GC, PCR; Future; Expected date: 01/27/2025  -     Vaginosis Screen by DNA Probe; Future; Expected date: 01/27/2025    Obesity (BMI 30.0-34.9)          ICD-10-CM ICD-9-CM    1. Surveillance of contraceptive injection  Z30.42 V25.49 medroxyPROGESTERone (DEPO-PROVERA) syringe 150 mg      2. Encounter for screening for infections with a predominantly sexual mode of transmission  Z11.3 V74.5 Chlamydia/GC, PCR      Vaginosis Screen by DNA Probe      Chlamydia/GC, PCR      Vaginosis Screen by DNA Probe      3. High risk heterosexual behavior  Z72.51 V69.2 Chlamydia/GC, PCR      Vaginosis Screen by DNA Probe      Chlamydia/GC, PCR      Vaginosis Screen by DNA Probe      4. Vaginal odor  N89.8 625.8 Chlamydia/GC, PCR      Vaginosis Screen by DNA Probe      Chlamydia/GC, PCR      Vaginosis Screen by DNA Probe      5. Obesity (BMI 30.0-34.9)  E66.811 278.00           Plan:  Depo Provera 150 mg IM given, Reviewed "ACHES" of contraceptives and possible S/E  Encouraged Vitamin D and calcium supplements or daily multivitamin  Instructed on condom use during each sexual encounter to decrease STD exposure risk  Discussed vaginitis prevention measures    Follow up in about 3 months (around 4/27/2025) for repeat depo injection.                    "

## 2025-08-18 ENCOUNTER — HOSPITAL ENCOUNTER (EMERGENCY)
Facility: HOSPITAL | Age: 25
Discharge: HOME | End: 2025-08-18

## 2025-08-18 VITALS
HEART RATE: 87 BPM | BODY MASS INDEX: 33.06 KG/M2 | TEMPERATURE: 98 F | WEIGHT: 164 LBS | OXYGEN SATURATION: 100 % | DIASTOLIC BLOOD PRESSURE: 79 MMHG | RESPIRATION RATE: 16 BRPM | HEIGHT: 59 IN | SYSTOLIC BLOOD PRESSURE: 116 MMHG

## 2025-08-18 DIAGNOSIS — K02.9 PAIN DUE TO DENTAL CARIES: Primary | ICD-10-CM

## 2025-08-18 LAB
B-HCG UR QL: NEGATIVE
CTP QC/QA: YES

## 2025-08-18 PROCEDURE — 96372 THER/PROPH/DIAG INJ SC/IM: CPT | Performed by: NURSE PRACTITIONER

## 2025-08-18 PROCEDURE — 99284 EMERGENCY DEPT VISIT MOD MDM: CPT | Mod: 25

## 2025-08-18 PROCEDURE — 63600175 PHARM REV CODE 636 W HCPCS: Mod: JZ,TB | Performed by: NURSE PRACTITIONER

## 2025-08-18 PROCEDURE — 81025 URINE PREGNANCY TEST: CPT | Performed by: NURSE PRACTITIONER

## 2025-08-18 RX ORDER — KETOROLAC TROMETHAMINE 30 MG/ML
60 INJECTION, SOLUTION INTRAMUSCULAR; INTRAVENOUS
Status: COMPLETED | OUTPATIENT
Start: 2025-08-18 | End: 2025-08-18

## 2025-08-18 RX ORDER — HYDROCODONE BITARTRATE AND ACETAMINOPHEN 5; 325 MG/1; MG/1
1 TABLET ORAL EVERY 6 HOURS PRN
Qty: 12 TABLET | Refills: 0 | Status: SHIPPED | OUTPATIENT
Start: 2025-08-18

## 2025-08-18 RX ADMIN — KETOROLAC TROMETHAMINE 60 MG: 30 INJECTION, SOLUTION INTRAMUSCULAR at 08:08
